# Patient Record
Sex: FEMALE | Race: WHITE | NOT HISPANIC OR LATINO | URBAN - METROPOLITAN AREA
[De-identification: names, ages, dates, MRNs, and addresses within clinical notes are randomized per-mention and may not be internally consistent; named-entity substitution may affect disease eponyms.]

---

## 2023-10-02 ENCOUNTER — LAB REQUISITION (OUTPATIENT)
Dept: LAB | Facility: HOSPITAL | Age: 88
End: 2023-10-02
Payer: MEDICARE

## 2023-10-02 DIAGNOSIS — J44.9 CHRONIC OBSTRUCTIVE PULMONARY DISEASE, UNSPECIFIED (MULTI): ICD-10-CM

## 2023-10-02 LAB
ANION GAP SERPL CALC-SCNC: 11 MMOL/L
BUN SERPL-MCNC: 18 MG/DL (ref 8–25)
CALCIUM SERPL-MCNC: 9.6 MG/DL (ref 8.5–10.4)
CHLORIDE SERPL-SCNC: 98 MMOL/L (ref 97–107)
CO2 SERPL-SCNC: 31 MMOL/L (ref 24–31)
CREAT SERPL-MCNC: 0.8 MG/DL (ref 0.4–1.6)
ERYTHROCYTE [DISTWIDTH] IN BLOOD BY AUTOMATED COUNT: 15.3 % (ref 11.5–14.5)
GFR SERPL CREATININE-BSD FRML MDRD: 68 ML/MIN/1.73M*2
GLUCOSE SERPL-MCNC: 124 MG/DL (ref 65–99)
HCT VFR BLD AUTO: 39.1 % (ref 36–46)
HGB BLD-MCNC: 12.2 G/DL (ref 12–16)
MCH RBC QN AUTO: 27.6 PG (ref 26–34)
MCHC RBC AUTO-ENTMCNC: 31.2 G/DL (ref 32–36)
MCV RBC AUTO: 89 FL (ref 80–100)
NRBC BLD-RTO: 0 /100 WBCS (ref 0–0)
PLATELET # BLD AUTO: 357 X10*3/UL (ref 150–450)
PMV BLD AUTO: 10.8 FL (ref 7.5–11.5)
POTASSIUM SERPL-SCNC: 3.9 MMOL/L (ref 3.4–5.1)
RBC # BLD AUTO: 4.42 X10*6/UL (ref 4–5.2)
SODIUM SERPL-SCNC: 140 MMOL/L (ref 133–145)
WBC # BLD AUTO: 11.2 X10*3/UL (ref 4.4–11.3)

## 2023-10-02 PROCEDURE — 85027 COMPLETE CBC AUTOMATED: CPT | Mod: OUT | Performed by: INTERNAL MEDICINE

## 2023-10-02 PROCEDURE — 82310 ASSAY OF CALCIUM: CPT | Mod: OUT | Performed by: INTERNAL MEDICINE

## 2023-10-02 PROCEDURE — 82374 ASSAY BLOOD CARBON DIOXIDE: CPT | Mod: OUT | Performed by: INTERNAL MEDICINE

## 2023-10-02 PROCEDURE — 80048 BASIC METABOLIC PNL TOTAL CA: CPT | Mod: OUT | Performed by: INTERNAL MEDICINE

## 2023-10-03 ENCOUNTER — LAB REQUISITION (OUTPATIENT)
Dept: LAB | Facility: HOSPITAL | Age: 88
End: 2023-10-03

## 2023-10-03 DIAGNOSIS — J44.9 CHRONIC OBSTRUCTIVE PULMONARY DISEASE, UNSPECIFIED (MULTI): ICD-10-CM

## 2023-10-23 ENCOUNTER — NURSING HOME VISIT (OUTPATIENT)
Dept: POST ACUTE CARE | Facility: EXTERNAL LOCATION | Age: 88
End: 2023-10-23
Payer: MEDICARE

## 2023-10-23 DIAGNOSIS — I50.22 CHRONIC SYSTOLIC CONGESTIVE HEART FAILURE (MULTI): ICD-10-CM

## 2023-10-23 DIAGNOSIS — R47.01 APHASIA: ICD-10-CM

## 2023-10-23 DIAGNOSIS — H10.33 ACUTE BACTERIAL CONJUNCTIVITIS OF BOTH EYES: Primary | ICD-10-CM

## 2023-10-23 DIAGNOSIS — I63.9 CEREBROVASCULAR ACCIDENT (CVA), UNSPECIFIED MECHANISM (MULTI): ICD-10-CM

## 2023-10-23 DIAGNOSIS — J06.9 ACUTE UPPER RESPIRATORY INFECTION: ICD-10-CM

## 2023-10-23 PROCEDURE — 99309 SBSQ NF CARE MODERATE MDM 30: CPT | Performed by: PHYSICIAN ASSISTANT

## 2023-10-23 ASSESSMENT — ENCOUNTER SYMPTOMS
ABDOMINAL PAIN: 0
DIZZINESS: 0
NERVOUS/ANXIOUS: 0
WHEEZING: 0
NAUSEA: 0
RHINORRHEA: 1
VOMITING: 0
APPETITE CHANGE: 0
DYSURIA: 0
CHILLS: 0
FREQUENCY: 0
EYE DISCHARGE: 1
HEADACHES: 0
TREMORS: 0
WEAKNESS: 0
COUGH: 1
FEVER: 0
CONFUSION: 0
HEMATURIA: 0
SHORTNESS OF BREATH: 0
EYE REDNESS: 1
DIARRHEA: 0
CONSTIPATION: 0

## 2023-10-23 NOTE — ASSESSMENT & PLAN NOTE
Possibly sinusitis.  Pt has had pneumonia multiple times.   Will start augmentin for possible sinusitis.  Chest-ray is negative.  Pt at high risk for progression to pneumonia.  Monitor closely. Covid swab was negative.  Will r/o Flu.   CBC and bmp in am.

## 2023-10-23 NOTE — PROGRESS NOTES
No chief complaint on file.      Subjective   [id number] : Leola Ward is a 89 y.o. female LTC resident at [facility].    HPI  Notified by nursing that pt is having cold symptoms.  She has redness and drainage from both eyes.  She is noted to be sneezing with signficant nasal congestion.  No fevers or chills reported.  She is eating and drinking well.  No shortness of breath or cough is noted.  She had chest x-ray which did not show any pneumonia or chf.   Covid Swab is negative.  Facility does not have flu swab but will be able to do one tomorrow.    Review of Systems   Constitutional:  Negative for appetite change, chills and fever.   HENT:  Positive for congestion, rhinorrhea and sneezing.    Eyes:  Positive for discharge and redness.   Respiratory:  Positive for cough. Negative for shortness of breath and wheezing.    Cardiovascular:  Negative for chest pain and leg swelling.   Gastrointestinal:  Negative for abdominal pain, constipation, diarrhea, nausea and vomiting.   Genitourinary:  Negative for dysuria, frequency and hematuria.   Neurological:  Negative for dizziness, tremors, weakness and headaches.   Psychiatric/Behavioral:  Negative for confusion. The patient is not nervous/anxious.    All other systems reviewed and are negative.      Objective   There were no vitals taken for this visit.   Physical Exam  Constitutional:       General: She is not in acute distress.     Appearance: She is ill-appearing.   Eyes:      General:         Right eye: Discharge present.         Left eye: Discharge present.     Pupils: Pupils are equal, round, and reactive to light.   Cardiovascular:      Rate and Rhythm: Normal rate and regular rhythm.      Heart sounds: No murmur heard.  Pulmonary:      Effort: Pulmonary effort is normal.      Breath sounds: No wheezing, rhonchi or rales.   Abdominal:      General: Abdomen is flat. Bowel sounds are normal. There is no distension.      Palpations: Abdomen is soft. There is no  "mass.      Tenderness: There is no abdominal tenderness.   Musculoskeletal:         General: No swelling. Normal range of motion.   Skin:     General: Skin is warm and dry.      Findings: No rash.   Neurological:      Mental Status: She is alert and oriented to person, place, and time. Mental status is at baseline.      Comments: Right sided hemiparesis and aphasia.        No lab exists for component: \"CBC BMP\"  Assessment/Plan   Problem List Items Addressed This Visit             ICD-10-CM    Acute bacterial conjunctivitis of both eyes - Primary H10.33     Gentamicin 2gtts to both eyes TID x 7 days.            Acute upper respiratory infection J06.9     Possibly sinusitis.  Pt has had pneumonia multiple times.   Will start augmentin for possible sinusitis.  Chest-ray is negative.  Pt at high risk for progression to pneumonia.  Monitor closely. Covid swab was negative.  Will r/o Flu.   CBC and bmp in am.         Chronic systolic congestive heart failure (CMS/HCC) I50.22     Continue torsemide 20mg and spironalactone.  Monitor labs.          CVA (cerebral vascular accident) (CMS/HCC) I63.9    Aphasia R47.01           "

## 2023-10-23 NOTE — LETTER
Patient: Leola Ward  : 1928    Encounter Date: 10/23/2023    No chief complaint on file.      Subjective  [id number] : Leola Ward is a 89 y.o. female LTC resident at [facility].    HPI  Notified by nursing that pt is having cold symptoms.  She has redness and drainage from both eyes.  She is noted to be sneezing with signficant nasal congestion.  No fevers or chills reported.  She is eating and drinking well.  No shortness of breath or cough is noted.  She had chest x-ray which did not show any pneumonia or chf.   Covid Swab is negative.  Facility does not have flu swab but will be able to do one tomorrow.    Review of Systems   Constitutional:  Negative for appetite change, chills and fever.   HENT:  Positive for congestion, rhinorrhea and sneezing.    Eyes:  Positive for discharge and redness.   Respiratory:  Positive for cough. Negative for shortness of breath and wheezing.    Cardiovascular:  Negative for chest pain and leg swelling.   Gastrointestinal:  Negative for abdominal pain, constipation, diarrhea, nausea and vomiting.   Genitourinary:  Negative for dysuria, frequency and hematuria.   Neurological:  Negative for dizziness, tremors, weakness and headaches.   Psychiatric/Behavioral:  Negative for confusion. The patient is not nervous/anxious.    All other systems reviewed and are negative.      Objective  There were no vitals taken for this visit.   Physical Exam  Constitutional:       General: She is not in acute distress.     Appearance: She is ill-appearing.   Eyes:      General:         Right eye: Discharge present.         Left eye: Discharge present.     Pupils: Pupils are equal, round, and reactive to light.   Cardiovascular:      Rate and Rhythm: Normal rate and regular rhythm.      Heart sounds: No murmur heard.  Pulmonary:      Effort: Pulmonary effort is normal.      Breath sounds: No wheezing, rhonchi or rales.   Abdominal:      General: Abdomen is flat. Bowel sounds are normal.  "There is no distension.      Palpations: Abdomen is soft. There is no mass.      Tenderness: There is no abdominal tenderness.   Musculoskeletal:         General: No swelling. Normal range of motion.   Skin:     General: Skin is warm and dry.      Findings: No rash.   Neurological:      Mental Status: She is alert and oriented to person, place, and time. Mental status is at baseline.      Comments: Right sided hemiparesis and aphasia.        No lab exists for component: \"CBC BMP\"  Assessment/Plan  Problem List Items Addressed This Visit             ICD-10-CM    Acute bacterial conjunctivitis of both eyes - Primary H10.33     Gentamicin 2gtts to both eyes TID x 7 days.            Acute upper respiratory infection J06.9     Possibly sinusitis.  Pt has had pneumonia multiple times.   Will start augmentin for possible sinusitis.  Chest-ray is negative.  Pt at high risk for progression to pneumonia.  Monitor closely. Covid swab was negative.  Will r/o Flu.   CBC and bmp in am.         Chronic systolic congestive heart failure (CMS/HCC) I50.22     Continue torsemide 20mg and spironalactone.  Monitor labs.          CVA (cerebral vascular accident) (CMS/HCC) I63.9    Aphasia R47.01               Electronically Signed By: Marie Cuevas PA-C   10/23/23  7:55 PM  "

## 2023-10-24 ENCOUNTER — LAB REQUISITION (OUTPATIENT)
Dept: LAB | Facility: HOSPITAL | Age: 88
End: 2023-10-24
Payer: MEDICARE

## 2023-10-24 DIAGNOSIS — J44.9 CHRONIC OBSTRUCTIVE PULMONARY DISEASE, UNSPECIFIED (MULTI): ICD-10-CM

## 2023-10-24 LAB
ANION GAP SERPL CALC-SCNC: 12 MMOL/L
BUN SERPL-MCNC: 28 MG/DL (ref 8–25)
CALCIUM SERPL-MCNC: 9 MG/DL (ref 8.5–10.4)
CHLORIDE SERPL-SCNC: 94 MMOL/L (ref 97–107)
CO2 SERPL-SCNC: 30 MMOL/L (ref 24–31)
CREAT SERPL-MCNC: 0.9 MG/DL (ref 0.4–1.6)
ERYTHROCYTE [DISTWIDTH] IN BLOOD BY AUTOMATED COUNT: 15.2 % (ref 11.5–14.5)
GFR SERPL CREATININE-BSD FRML MDRD: 59 ML/MIN/1.73M*2
GLUCOSE SERPL-MCNC: 103 MG/DL (ref 65–99)
HCT VFR BLD AUTO: 39.9 % (ref 36–46)
HGB BLD-MCNC: 12.4 G/DL (ref 12–16)
MCH RBC QN AUTO: 27.4 PG (ref 26–34)
MCHC RBC AUTO-ENTMCNC: 31.1 G/DL (ref 32–36)
MCV RBC AUTO: 88 FL (ref 80–100)
NRBC BLD-RTO: 0 /100 WBCS (ref 0–0)
PLATELET # BLD AUTO: 346 X10*3/UL (ref 150–450)
PMV BLD AUTO: 10.7 FL (ref 7.5–11.5)
POTASSIUM SERPL-SCNC: 3.8 MMOL/L (ref 3.4–5.1)
RBC # BLD AUTO: 4.53 X10*6/UL (ref 4–5.2)
SODIUM SERPL-SCNC: 136 MMOL/L (ref 133–145)
WBC # BLD AUTO: 12 X10*3/UL (ref 4.4–11.3)

## 2023-10-24 PROCEDURE — 80048 BASIC METABOLIC PNL TOTAL CA: CPT | Mod: OUT | Performed by: INTERNAL MEDICINE

## 2023-10-24 PROCEDURE — 85027 COMPLETE CBC AUTOMATED: CPT | Mod: OUT | Performed by: INTERNAL MEDICINE

## 2023-10-24 PROCEDURE — 87502 INFLUENZA DNA AMP PROBE: CPT | Mod: OUT,TRILAB | Performed by: INTERNAL MEDICINE

## 2023-10-25 LAB
FLUAV RNA RESP QL NAA+PROBE: NOT DETECTED
FLUBV RNA RESP QL NAA+PROBE: NOT DETECTED

## 2023-11-02 ENCOUNTER — NURSING HOME VISIT (OUTPATIENT)
Dept: POST ACUTE CARE | Facility: EXTERNAL LOCATION | Age: 88
End: 2023-11-02
Payer: MEDICARE

## 2023-11-02 VITALS
HEART RATE: 78 BPM | DIASTOLIC BLOOD PRESSURE: 67 MMHG | RESPIRATION RATE: 18 BRPM | OXYGEN SATURATION: 93 % | WEIGHT: 194.6 LBS | SYSTOLIC BLOOD PRESSURE: 115 MMHG | TEMPERATURE: 98.1 F

## 2023-11-02 DIAGNOSIS — D72.829 LEUKOCYTOSIS, UNSPECIFIED TYPE: Primary | ICD-10-CM

## 2023-11-02 DIAGNOSIS — R47.01 APHASIA: ICD-10-CM

## 2023-11-02 DIAGNOSIS — I63.9 CEREBROVASCULAR ACCIDENT (CVA), UNSPECIFIED MECHANISM (MULTI): ICD-10-CM

## 2023-11-02 DIAGNOSIS — I50.22 CHRONIC SYSTOLIC CONGESTIVE HEART FAILURE (MULTI): ICD-10-CM

## 2023-11-02 PROCEDURE — 99309 SBSQ NF CARE MODERATE MDM 30: CPT | Performed by: PHYSICIAN ASSISTANT

## 2023-11-02 ASSESSMENT — ENCOUNTER SYMPTOMS
NERVOUS/ANXIOUS: 0
TREMORS: 0
WEAKNESS: 0
HEADACHES: 0
NAUSEA: 0
WHEEZING: 0
CONSTIPATION: 0
CHILLS: 0
DIZZINESS: 0
EYE REDNESS: 0
CONFUSION: 0
FREQUENCY: 0
SHORTNESS OF BREATH: 0
DIARRHEA: 0
DYSURIA: 0
HEMATURIA: 0
RHINORRHEA: 0
FEVER: 0
ABDOMINAL PAIN: 0
COUGH: 0
APPETITE CHANGE: 0
EYE DISCHARGE: 0
VOMITING: 0

## 2023-11-02 NOTE — LETTER
Patient: Leola Ward  : 1928    Encounter Date: 2023    No chief complaint on file.      Subjective  [id number] : Leola Ward is a 89 y.o. female LTC resident at [facility].    HPI   Pt seen while sitting up in wheelchair.  Pt had routine labs today.  Noted to have an elevated WBC of 16 on todays labs.   No fevers or chills noted.   She does not appear to be septic.  She does have some mild lower abdominal discomfort on exam.   She has h/o UTI's.  Pt recently treated for bilateral conjunctivitis which has resolved.   She was also treated for URI and these symptoms have also resolved.    She is eating and drinking well.  No shortness of breath or cough is noted.  She had chest x-ray on 10/31 which did not show any pneumonia or chf.    She is alert, pleasant and interactive.    Review of Systems   Constitutional:  Negative for appetite change, chills and fever.   HENT:  Negative for congestion, rhinorrhea and sneezing.    Eyes:  Negative for discharge and redness.   Respiratory:  Negative for cough, shortness of breath and wheezing.    Cardiovascular:  Negative for chest pain and leg swelling.   Gastrointestinal:  Negative for abdominal pain, constipation, diarrhea, nausea and vomiting.   Genitourinary:  Negative for dysuria, frequency and hematuria.   Neurological:  Negative for dizziness, tremors, weakness and headaches.   Psychiatric/Behavioral:  Negative for confusion. The patient is not nervous/anxious.    All other systems reviewed and are negative.      Objective  /67   Pulse 78   Temp 36.7 °C (98.1 °F)   Resp 18   Wt 88.3 kg (194 lb 9.6 oz)   SpO2 93%    Physical Exam  Constitutional:       General: She is not in acute distress.     Appearance: She is not ill-appearing.   Eyes:      Pupils: Pupils are equal, round, and reactive to light.   Cardiovascular:      Rate and Rhythm: Normal rate and regular rhythm.      Heart sounds: No murmur heard.  Pulmonary:      Effort: Pulmonary  "effort is normal.      Breath sounds: No wheezing, rhonchi or rales.   Abdominal:      General: Abdomen is flat. Bowel sounds are normal. There is no distension.      Palpations: Abdomen is soft. There is no mass.      Tenderness: There is no abdominal tenderness.   Musculoskeletal:         General: No swelling. Normal range of motion.   Skin:     General: Skin is warm and dry.      Findings: No rash.   Neurological:      Mental Status: She is alert and oriented to person, place, and time. Mental status is at baseline.      Comments: Right sided hemiparesis and aphasia.        No lab exists for component: \"CBC BMP\"  Assessment/Plan                        Chronic systolic congestive heart failure (CMS/HCC) I50.22       Continue torsemide 20mg and spironalactone.  Monitor labs.            CVA (cerebral vascular accident) (CMS/HCC) I63.9     Aphasia R47.01       leukocytosis   PT had chest x-ray on 10/31 which was negative for pneumonia.  WBC is 16 on today's labs.   Will send UA C&S to r/o UTI.  Repeat CBC on monday          Electronically Signed By: Marie Cuevas PA-C   11/2/23  2:04 PM  "

## 2023-11-02 NOTE — PROGRESS NOTES
No chief complaint on file.      Subjective   [id number] : Leola Ward is a 89 y.o. female LTC resident at [facility].    HPI   Pt seen while sitting up in wheelchair.  Pt had routine labs today.  Noted to have an elevated WBC of 16 on todays labs.   No fevers or chills noted.   She does not appear to be septic.  She does have some mild lower abdominal discomfort on exam.   She has h/o UTI's.  Pt recently treated for bilateral conjunctivitis which has resolved.   She was also treated for URI and these symptoms have also resolved.    She is eating and drinking well.  No shortness of breath or cough is noted.  She had chest x-ray on 10/31 which did not show any pneumonia or chf.    She is alert, pleasant and interactive.    Review of Systems   Constitutional:  Negative for appetite change, chills and fever.   HENT:  Negative for congestion, rhinorrhea and sneezing.    Eyes:  Negative for discharge and redness.   Respiratory:  Negative for cough, shortness of breath and wheezing.    Cardiovascular:  Negative for chest pain and leg swelling.   Gastrointestinal:  Negative for abdominal pain, constipation, diarrhea, nausea and vomiting.   Genitourinary:  Negative for dysuria, frequency and hematuria.   Neurological:  Negative for dizziness, tremors, weakness and headaches.   Psychiatric/Behavioral:  Negative for confusion. The patient is not nervous/anxious.    All other systems reviewed and are negative.      Objective   /67   Pulse 78   Temp 36.7 °C (98.1 °F)   Resp 18   Wt 88.3 kg (194 lb 9.6 oz)   SpO2 93%    Physical Exam  Constitutional:       General: She is not in acute distress.     Appearance: She is not ill-appearing.   Eyes:      Pupils: Pupils are equal, round, and reactive to light.   Cardiovascular:      Rate and Rhythm: Normal rate and regular rhythm.      Heart sounds: No murmur heard.  Pulmonary:      Effort: Pulmonary effort is normal.      Breath sounds: No wheezing, rhonchi or rales.  "  Abdominal:      General: Abdomen is flat. Bowel sounds are normal. There is no distension.      Palpations: Abdomen is soft. There is no mass.      Tenderness: There is no abdominal tenderness.   Musculoskeletal:         General: No swelling. Normal range of motion.   Skin:     General: Skin is warm and dry.      Findings: No rash.   Neurological:      Mental Status: She is alert and oriented to person, place, and time. Mental status is at baseline.      Comments: Right sided hemiparesis and aphasia.        No lab exists for component: \"CBC BMP\"  Assessment/Plan                         Chronic systolic congestive heart failure (CMS/HCC) I50.22       Continue torsemide 20mg and spironalactone.  Monitor labs.            CVA (cerebral vascular accident) (CMS/HCC) I63.9     Aphasia R47.01       leukocytosis   PT had chest x-ray on 10/31 which was negative for pneumonia.  WBC is 16 on today's labs.   Will send UA C&S to r/o UTI.  Repeat CBC on monday      "

## 2023-11-03 ENCOUNTER — LAB REQUISITION (OUTPATIENT)
Dept: LAB | Facility: HOSPITAL | Age: 88
End: 2023-11-03
Payer: MEDICARE

## 2023-11-03 DIAGNOSIS — Z90.710 ACQUIRED ABSENCE OF BOTH CERVIX AND UTERUS: ICD-10-CM

## 2023-11-03 DIAGNOSIS — R06.00 DYSPNEA, UNSPECIFIED: ICD-10-CM

## 2023-11-03 DIAGNOSIS — R13.10 DYSPHAGIA, UNSPECIFIED: ICD-10-CM

## 2023-11-03 LAB
APPEARANCE UR: CLEAR
BILIRUB UR STRIP.AUTO-MCNC: NEGATIVE MG/DL
COLOR UR: NORMAL
GLUCOSE UR STRIP.AUTO-MCNC: NORMAL MG/DL
KETONES UR STRIP.AUTO-MCNC: NEGATIVE MG/DL
LEUKOCYTE ESTERASE UR QL STRIP.AUTO: NEGATIVE
NITRITE UR QL STRIP.AUTO: NEGATIVE
PH UR STRIP.AUTO: 6 [PH]
PROT UR STRIP.AUTO-MCNC: NEGATIVE MG/DL
RBC # UR STRIP.AUTO: NEGATIVE /UL
SP GR UR STRIP.AUTO: 1.01
UROBILINOGEN UR STRIP.AUTO-MCNC: NORMAL MG/DL

## 2023-11-03 PROCEDURE — 87086 URINE CULTURE/COLONY COUNT: CPT

## 2023-11-03 PROCEDURE — 81003 URINALYSIS AUTO W/O SCOPE: CPT | Mod: OUT | Performed by: INTERNAL MEDICINE

## 2023-11-03 PROCEDURE — 87086 URINE CULTURE/COLONY COUNT: CPT | Mod: OUT,TRILAB,WESLAB | Performed by: INTERNAL MEDICINE

## 2023-11-03 PROCEDURE — 81003 URINALYSIS AUTO W/O SCOPE: CPT

## 2023-11-05 LAB — BACTERIA UR CULT: NORMAL

## 2023-11-06 ENCOUNTER — NURSING HOME VISIT (OUTPATIENT)
Dept: POST ACUTE CARE | Facility: EXTERNAL LOCATION | Age: 88
End: 2023-11-06
Payer: MEDICARE

## 2023-11-06 VITALS
SYSTOLIC BLOOD PRESSURE: 110 MMHG | OXYGEN SATURATION: 92 % | TEMPERATURE: 98.5 F | RESPIRATION RATE: 20 BRPM | WEIGHT: 194.6 LBS | HEART RATE: 83 BPM | DIASTOLIC BLOOD PRESSURE: 70 MMHG

## 2023-11-06 DIAGNOSIS — I63.9 CEREBROVASCULAR ACCIDENT (CVA), UNSPECIFIED MECHANISM (MULTI): ICD-10-CM

## 2023-11-06 DIAGNOSIS — I50.22 CHRONIC SYSTOLIC CONGESTIVE HEART FAILURE (MULTI): ICD-10-CM

## 2023-11-06 DIAGNOSIS — J18.9 PNEUMONIA OF RIGHT UPPER LOBE DUE TO INFECTIOUS ORGANISM: Primary | ICD-10-CM

## 2023-11-06 DIAGNOSIS — R47.01 APHASIA: ICD-10-CM

## 2023-11-06 PROCEDURE — 99309 SBSQ NF CARE MODERATE MDM 30: CPT | Performed by: PHYSICIAN ASSISTANT

## 2023-11-06 ASSESSMENT — ENCOUNTER SYMPTOMS
SHORTNESS OF BREATH: 0
HEADACHES: 0
ABDOMINAL PAIN: 0
TREMORS: 0
VOMITING: 0
CONFUSION: 0
CONSTIPATION: 0
FREQUENCY: 0
DIZZINESS: 0
RHINORRHEA: 0
NERVOUS/ANXIOUS: 0
WHEEZING: 0
APPETITE CHANGE: 0
NAUSEA: 0
EYE DISCHARGE: 0
HEMATURIA: 0
CHILLS: 0
FEVER: 0
COUGH: 0
EYE REDNESS: 0
WEAKNESS: 0
DIARRHEA: 0
DYSURIA: 0

## 2023-11-06 NOTE — LETTER
Patient: Leola Ward  : 1928    Encounter Date: 2023    No chief complaint on file.      Subjective  [id number] : Leola Ward is a 89 y.o. female LTC resident at [facility].    HPI   Was asked to see by nursing.   Nursing reports that pt is short of breath with a pulse ox of 87 on room air.  Pt has PRN oxygen and is 92 % on 2liters of oxygen.  She has some noted rhonchi to the right side of chest.  She completed course of augmentin over the weekend.  She did have urinalysis sent over the weekend which was negative.   Pt had elevated WBC last week.   No fevers or chills reported.  Pt is alert, pleasant and interactive.  She does appear to be short of breath.  Pt seen while sitting up in wheelchair.  Pt had routine labs today.  Noted to have an elevated WBC of 16 on todays labs.   No fevers or chills noted.   She does not appear to be septic.  She does have some mild lower abdominal discomfort on exam.   She has h/o UTI's.  Pt recently treated for bilateral conjunctivitis which has resolved.   She was also treated for URI and these symptoms have also resolved.    She is eating and drinking well.  No shortness of breath or cough is noted.  She had chest x-ray on 10/31 which did not show any pneumonia or chf.    She is alert, pleasant and interactive.    Review of Systems   Constitutional:  Negative for appetite change, chills and fever.   HENT:  Negative for congestion, rhinorrhea and sneezing.    Eyes:  Negative for discharge and redness.   Respiratory:  Negative for cough, shortness of breath and wheezing.    Cardiovascular:  Negative for chest pain and leg swelling.   Gastrointestinal:  Negative for abdominal pain, constipation, diarrhea, nausea and vomiting.   Genitourinary:  Negative for dysuria, frequency and hematuria.   Neurological:  Negative for dizziness, tremors, weakness and headaches.   Psychiatric/Behavioral:  Negative for confusion. The patient is not nervous/anxious.    All other  "systems reviewed and are negative.      Objective  /70   Pulse 83   Temp 36.9 °C (98.5 °F)   Resp 20   Wt 88.3 kg (194 lb 9.6 oz)   SpO2 92%    Physical Exam  Constitutional:       General: She is not in acute distress.     Appearance: She is not ill-appearing.   Eyes:      Pupils: Pupils are equal, round, and reactive to light.   Cardiovascular:      Rate and Rhythm: Normal rate and regular rhythm.      Heart sounds: No murmur heard.  Pulmonary:      Effort: Pulmonary effort is normal.      Breath sounds: Rhonchi (right upper/ middle lobes) present. No wheezing or rales.   Abdominal:      General: Abdomen is flat. Bowel sounds are normal. There is no distension.      Palpations: Abdomen is soft. There is no mass.      Tenderness: There is no abdominal tenderness.   Musculoskeletal:         General: No swelling. Normal range of motion.   Skin:     General: Skin is warm and dry.      Findings: No rash.   Neurological:      Mental Status: She is alert and oriented to person, place, and time. Mental status is at baseline.      Comments: Right sided hemiparesis and aphasia.        No lab exists for component: \"CBC BMP\"  Assessment/Plan        Pneumonia:  completed course of augmentin.   Symptoms worsening.  Start levaquin.   On oxygen and cough medication.                 Chronic systolic congestive heart failure (CMS/HCC) I50.22       Continue torsemide 20mg and spironalactone.  Monitor labs.            CVA (cerebral vascular accident) (CMS/HCC) I63.9     Aphasia R47.01       leukocytosis   PT had chest x-ray on 10/31 which was negative for pneumonia.  WBC is 16 on today's labs.   Will send UA C&S to r/o UTI.  Repeat CBC on monday          Electronically Signed By: Marie Cuevas PA-C   11/6/23  5:26 PM  "

## 2023-11-06 NOTE — ASSESSMENT & PLAN NOTE
Completed course of augmentin over the weekend.   Symptoms worsening.   Start levaquin 500mg daily. Currently on oxygen.

## 2023-11-06 NOTE — PROGRESS NOTES
No chief complaint on file.      Subjective   [id number] : Leola Ward is a 89 y.o. female LTC resident at [facility].    HPI   Was asked to see by nursing.   Nursing reports that pt is short of breath with a pulse ox of 87 on room air.  Pt has PRN oxygen and is 92 % on 2liters of oxygen.  She has some noted rhonchi to the right side of chest.  She completed course of augmentin over the weekend.  She did have urinalysis sent over the weekend which was negative.   Pt had elevated WBC last week.   No fevers or chills reported.  Pt is alert, pleasant and interactive.  She does appear to be short of breath.  Pt seen while sitting up in wheelchair.  Pt had routine labs today.  Noted to have an elevated WBC of 16 on todays labs.   No fevers or chills noted.   She does not appear to be septic.  She does have some mild lower abdominal discomfort on exam.   She has h/o UTI's.  Pt recently treated for bilateral conjunctivitis which has resolved.   She was also treated for URI and these symptoms have also resolved.    She is eating and drinking well.  No shortness of breath or cough is noted.  She had chest x-ray on 10/31 which did not show any pneumonia or chf.    She is alert, pleasant and interactive.    Review of Systems   Constitutional:  Negative for appetite change, chills and fever.   HENT:  Negative for congestion, rhinorrhea and sneezing.    Eyes:  Negative for discharge and redness.   Respiratory:  Negative for cough, shortness of breath and wheezing.    Cardiovascular:  Negative for chest pain and leg swelling.   Gastrointestinal:  Negative for abdominal pain, constipation, diarrhea, nausea and vomiting.   Genitourinary:  Negative for dysuria, frequency and hematuria.   Neurological:  Negative for dizziness, tremors, weakness and headaches.   Psychiatric/Behavioral:  Negative for confusion. The patient is not nervous/anxious.    All other systems reviewed and are negative.      Objective   /70   Pulse 83  "  Temp 36.9 °C (98.5 °F)   Resp 20   Wt 88.3 kg (194 lb 9.6 oz)   SpO2 92%    Physical Exam  Constitutional:       General: She is not in acute distress.     Appearance: She is not ill-appearing.   Eyes:      Pupils: Pupils are equal, round, and reactive to light.   Cardiovascular:      Rate and Rhythm: Normal rate and regular rhythm.      Heart sounds: No murmur heard.  Pulmonary:      Effort: Pulmonary effort is normal.      Breath sounds: Rhonchi (right upper/ middle lobes) present. No wheezing or rales.   Abdominal:      General: Abdomen is flat. Bowel sounds are normal. There is no distension.      Palpations: Abdomen is soft. There is no mass.      Tenderness: There is no abdominal tenderness.   Musculoskeletal:         General: No swelling. Normal range of motion.   Skin:     General: Skin is warm and dry.      Findings: No rash.   Neurological:      Mental Status: She is alert and oriented to person, place, and time. Mental status is at baseline.      Comments: Right sided hemiparesis and aphasia.        No lab exists for component: \"CBC BMP\"  Assessment/Plan         Pneumonia:  completed course of augmentin.   Symptoms worsening.  Start levaquin.   On oxygen and cough medication.                 Chronic systolic congestive heart failure (CMS/HCC) I50.22       Continue torsemide 20mg and spironalactone.  Monitor labs.            CVA (cerebral vascular accident) (CMS/HCC) I63.9     Aphasia R47.01       leukocytosis   PT had chest x-ray on 10/31 which was negative for pneumonia.  WBC is 16 on today's labs.   Will send UA C&S to r/o UTI.  Repeat CBC on monday      "

## 2023-11-07 ENCOUNTER — LAB REQUISITION (OUTPATIENT)
Dept: LAB | Facility: HOSPITAL | Age: 88
End: 2023-11-07
Payer: MEDICARE

## 2023-11-07 DIAGNOSIS — J44.9 CHRONIC OBSTRUCTIVE PULMONARY DISEASE, UNSPECIFIED (MULTI): ICD-10-CM

## 2023-11-07 LAB
ANION GAP SERPL CALC-SCNC: 6 MMOL/L
BUN SERPL-MCNC: 24 MG/DL (ref 8–25)
CALCIUM SERPL-MCNC: 9.7 MG/DL (ref 8.5–10.4)
CHLORIDE SERPL-SCNC: 96 MMOL/L (ref 97–107)
CO2 SERPL-SCNC: 36 MMOL/L (ref 24–31)
CREAT SERPL-MCNC: 0.8 MG/DL (ref 0.4–1.6)
ERYTHROCYTE [DISTWIDTH] IN BLOOD BY AUTOMATED COUNT: 14.8 % (ref 11.5–14.5)
GFR SERPL CREATININE-BSD FRML MDRD: 68 ML/MIN/1.73M*2
GLUCOSE SERPL-MCNC: 96 MG/DL (ref 65–99)
HCT VFR BLD AUTO: 39.9 % (ref 36–46)
HGB BLD-MCNC: 12.6 G/DL (ref 12–16)
MCH RBC QN AUTO: 27.3 PG (ref 26–34)
MCHC RBC AUTO-ENTMCNC: 31.6 G/DL (ref 32–36)
MCV RBC AUTO: 86 FL (ref 80–100)
NRBC BLD-RTO: 0 /100 WBCS (ref 0–0)
PLATELET # BLD AUTO: 448 X10*3/UL (ref 150–450)
POTASSIUM SERPL-SCNC: 4.8 MMOL/L (ref 3.4–5.1)
RBC # BLD AUTO: 4.62 X10*6/UL (ref 4–5.2)
SODIUM SERPL-SCNC: 138 MMOL/L (ref 133–145)
WBC # BLD AUTO: 14.9 X10*3/UL (ref 4.4–11.3)

## 2023-11-07 PROCEDURE — 82374 ASSAY BLOOD CARBON DIOXIDE: CPT | Mod: OUT | Performed by: INTERNAL MEDICINE

## 2023-11-07 PROCEDURE — 80048 BASIC METABOLIC PNL TOTAL CA: CPT | Mod: OUT | Performed by: INTERNAL MEDICINE

## 2023-11-07 PROCEDURE — 85027 COMPLETE CBC AUTOMATED: CPT | Mod: OUT | Performed by: INTERNAL MEDICINE

## 2023-11-09 ENCOUNTER — NURSING HOME VISIT (OUTPATIENT)
Dept: POST ACUTE CARE | Facility: EXTERNAL LOCATION | Age: 88
End: 2023-11-09
Payer: MEDICARE

## 2023-11-09 VITALS
OXYGEN SATURATION: 93 % | WEIGHT: 194.6 LBS | HEART RATE: 76 BPM | TEMPERATURE: 98.1 F | DIASTOLIC BLOOD PRESSURE: 70 MMHG | RESPIRATION RATE: 18 BRPM | SYSTOLIC BLOOD PRESSURE: 130 MMHG

## 2023-11-09 DIAGNOSIS — J18.9 PNEUMONIA OF RIGHT UPPER LOBE DUE TO INFECTIOUS ORGANISM: Primary | ICD-10-CM

## 2023-11-09 DIAGNOSIS — R19.7 DIARRHEA, UNSPECIFIED TYPE: ICD-10-CM

## 2023-11-09 DIAGNOSIS — I63.9 CEREBROVASCULAR ACCIDENT (CVA), UNSPECIFIED MECHANISM (MULTI): ICD-10-CM

## 2023-11-09 DIAGNOSIS — I50.22 CHRONIC SYSTOLIC CONGESTIVE HEART FAILURE (MULTI): ICD-10-CM

## 2023-11-09 DIAGNOSIS — D72.829 LEUKOCYTOSIS, UNSPECIFIED TYPE: ICD-10-CM

## 2023-11-09 PROCEDURE — 87493 C DIFF AMPLIFIED PROBE: CPT | Mod: OUT | Performed by: INTERNAL MEDICINE

## 2023-11-09 PROCEDURE — 99309 SBSQ NF CARE MODERATE MDM 30: CPT | Performed by: PHYSICIAN ASSISTANT

## 2023-11-09 ASSESSMENT — ENCOUNTER SYMPTOMS
COUGH: 1
HEMATURIA: 0
HEADACHES: 0
CHILLS: 0
FREQUENCY: 0
SHORTNESS OF BREATH: 1
WEAKNESS: 0
CONFUSION: 0
DIZZINESS: 0
NAUSEA: 1
WHEEZING: 1
VOMITING: 0
DYSURIA: 0
CONSTIPATION: 0
EYE DISCHARGE: 0
RHINORRHEA: 0
TREMORS: 0
EYE REDNESS: 0
FEVER: 0
NERVOUS/ANXIOUS: 0
APPETITE CHANGE: 0
DIARRHEA: 1
ABDOMINAL PAIN: 0

## 2023-11-09 NOTE — PROGRESS NOTES
No chief complaint on file.      Subjective   [id number] : Leola Ward is a 89 y.o. female LTC resident at [facility].    HPI   Was asked to see by nursing.    Pt currently on levaquin for pneumonia.   Pot continues to have productive cough and wheezing.  She is stable on oxygen.  She appears to be short of breath but is in good spirits and is laughing during my visit.  NO fevers or chills reported.   Nursing reported pt started having diarrhea this afternoon.  She has had multiple loose stools over the afternoon and nursing reported nausea and emesis x 1.   Pt was on augmentin prior to starting the course of levaquin and is likely at risk for c-diff infection.     Review of Systems   Constitutional:  Negative for appetite change, chills and fever.   HENT:  Negative for congestion, rhinorrhea and sneezing.    Eyes:  Negative for discharge and redness.   Respiratory:  Positive for cough, shortness of breath and wheezing.    Cardiovascular:  Negative for chest pain and leg swelling.   Gastrointestinal:  Positive for diarrhea and nausea. Negative for abdominal pain, constipation and vomiting.   Genitourinary:  Negative for dysuria, frequency and hematuria.   Neurological:  Negative for dizziness, tremors, weakness and headaches.   Psychiatric/Behavioral:  Negative for confusion. The patient is not nervous/anxious.    All other systems reviewed and are negative.      Objective   /70   Pulse 76   Temp 36.7 °C (98.1 °F)   Resp 18   Wt 88.3 kg (194 lb 9.6 oz)   SpO2 93%    Physical Exam  Constitutional:       General: She is not in acute distress.     Appearance: She is not ill-appearing.   Eyes:      Pupils: Pupils are equal, round, and reactive to light.   Cardiovascular:      Rate and Rhythm: Normal rate and regular rhythm.      Heart sounds: No murmur heard.  Pulmonary:      Effort: Pulmonary effort is normal.      Breath sounds: Wheezing and rhonchi (productive moist cough.  bilateral rhonchi.) present.  "No rales.   Abdominal:      General: Abdomen is flat. Bowel sounds are normal. There is no distension.      Palpations: Abdomen is soft. There is no mass.      Tenderness: There is no abdominal tenderness.   Musculoskeletal:         General: No swelling. Normal range of motion.   Skin:     General: Skin is warm and dry.      Findings: No rash.   Neurological:      Mental Status: She is alert and oriented to person, place, and time. Mental status is at baseline.      Comments: Right sided hemiparesis and aphasia.        No lab exists for component: \"CBC BMP\"  Assessment/Plan         Pneumonia: continue course of levaquin.   On oxygen and cough medication.   Give mucinex routine BID and albuterol aerosols TID routine.                  Chronic systolic congestive heart failure (CMS/HCC) I50.22       Continue torsemide 20mg and spironalactone.  Monitor labs.            CVA (cerebral vascular accident) (CMS/HCC) I63.9     Aphasia R47.01       leukocytosis     Being treated for pneumonia -  recheck labs in a couple of days    Diarrhea:   likely associated with antibiotic use.   Send stool to r/o C-diff.  Zofran prn for nausea.   Will give imodium and probiotic if C-diff negative.   Monitor for signs of dehydration.  She is currently eating and drinking well.          "

## 2023-11-09 NOTE — LETTER
Patient: Leola Ward  : 1928    Encounter Date: 2023    No chief complaint on file.      Subjective  [id number] : Leola Ward is a 89 y.o. female LTC resident at [facility].    HPI   Was asked to see by nursing.    Pt currently on levaquin for pneumonia.   Pot continues to have productive cough and wheezing.  She is stable on oxygen.  She appears to be short of breath but is in good spirits and is laughing during my visit.  NO fevers or chills reported.   Nursing reported pt started having diarrhea this afternoon.  She has had multiple loose stools over the afternoon and nursing reported nausea and emesis x 1.   Pt was on augmentin prior to starting the course of levaquin and is likely at risk for c-diff infection.     Review of Systems   Constitutional:  Negative for appetite change, chills and fever.   HENT:  Negative for congestion, rhinorrhea and sneezing.    Eyes:  Negative for discharge and redness.   Respiratory:  Positive for cough, shortness of breath and wheezing.    Cardiovascular:  Negative for chest pain and leg swelling.   Gastrointestinal:  Positive for diarrhea and nausea. Negative for abdominal pain, constipation and vomiting.   Genitourinary:  Negative for dysuria, frequency and hematuria.   Neurological:  Negative for dizziness, tremors, weakness and headaches.   Psychiatric/Behavioral:  Negative for confusion. The patient is not nervous/anxious.    All other systems reviewed and are negative.      Objective  /70   Pulse 76   Temp 36.7 °C (98.1 °F)   Resp 18   Wt 88.3 kg (194 lb 9.6 oz)   SpO2 93%    Physical Exam  Constitutional:       General: She is not in acute distress.     Appearance: She is not ill-appearing.   Eyes:      Pupils: Pupils are equal, round, and reactive to light.   Cardiovascular:      Rate and Rhythm: Normal rate and regular rhythm.      Heart sounds: No murmur heard.  Pulmonary:      Effort: Pulmonary effort is normal.      Breath sounds:  "Wheezing and rhonchi (productive moist cough.  bilateral rhonchi.) present. No rales.   Abdominal:      General: Abdomen is flat. Bowel sounds are normal. There is no distension.      Palpations: Abdomen is soft. There is no mass.      Tenderness: There is no abdominal tenderness.   Musculoskeletal:         General: No swelling. Normal range of motion.   Skin:     General: Skin is warm and dry.      Findings: No rash.   Neurological:      Mental Status: She is alert and oriented to person, place, and time. Mental status is at baseline.      Comments: Right sided hemiparesis and aphasia.        No lab exists for component: \"CBC BMP\"  Assessment/Plan        Pneumonia: continue course of levaquin.   On oxygen and cough medication.   Give mucinex routine BID and albuterol aerosols TID routine.                  Chronic systolic congestive heart failure (CMS/HCC) I50.22       Continue torsemide 20mg and spironalactone.  Monitor labs.            CVA (cerebral vascular accident) (CMS/HCC) I63.9     Aphasia R47.01       leukocytosis     Being treated for pneumonia -  recheck labs in a couple of days    Diarrhea:   likely associated with antibiotic use.   Send stool to r/o C-diff.  Zofran prn for nausea.   Will give imodium and probiotic if C-diff negative.   Monitor for signs of dehydration.  She is currently eating and drinking well.            Electronically Signed By: Marie Cuevas PA-C   11/9/23  8:25 PM  "

## 2023-11-10 ENCOUNTER — LAB REQUISITION (OUTPATIENT)
Dept: LAB | Facility: HOSPITAL | Age: 88
End: 2023-11-10
Payer: MEDICARE

## 2023-11-10 ENCOUNTER — NURSING HOME VISIT (OUTPATIENT)
Dept: POST ACUTE CARE | Facility: EXTERNAL LOCATION | Age: 88
End: 2023-11-10
Payer: MEDICARE

## 2023-11-10 VITALS
OXYGEN SATURATION: 94 % | DIASTOLIC BLOOD PRESSURE: 70 MMHG | HEART RATE: 76 BPM | RESPIRATION RATE: 20 BRPM | TEMPERATURE: 97.9 F | SYSTOLIC BLOOD PRESSURE: 130 MMHG | WEIGHT: 194.6 LBS

## 2023-11-10 DIAGNOSIS — I50.22 CHRONIC SYSTOLIC CONGESTIVE HEART FAILURE (MULTI): ICD-10-CM

## 2023-11-10 DIAGNOSIS — D72.829 LEUKOCYTOSIS, UNSPECIFIED TYPE: ICD-10-CM

## 2023-11-10 DIAGNOSIS — R19.7 DIARRHEA, UNSPECIFIED TYPE: ICD-10-CM

## 2023-11-10 DIAGNOSIS — J44.9 CHRONIC OBSTRUCTIVE PULMONARY DISEASE, UNSPECIFIED (MULTI): ICD-10-CM

## 2023-11-10 DIAGNOSIS — J18.9 PNEUMONIA OF RIGHT UPPER LOBE DUE TO INFECTIOUS ORGANISM: Primary | ICD-10-CM

## 2023-11-10 DIAGNOSIS — I63.9 CEREBROVASCULAR ACCIDENT (CVA), UNSPECIFIED MECHANISM (MULTI): ICD-10-CM

## 2023-11-10 LAB — C DIF TOX TCDA+TCDB STL QL NAA+PROBE: NOT DETECTED

## 2023-11-10 PROCEDURE — 99308 SBSQ NF CARE LOW MDM 20: CPT | Performed by: PHYSICIAN ASSISTANT

## 2023-11-10 ASSESSMENT — ENCOUNTER SYMPTOMS
CONFUSION: 0
RHINORRHEA: 0
CONSTIPATION: 0
ABDOMINAL PAIN: 0
TREMORS: 0
NAUSEA: 1
EYE REDNESS: 0
FREQUENCY: 0
DYSURIA: 0
DIARRHEA: 1
NERVOUS/ANXIOUS: 0
HEADACHES: 0
WHEEZING: 1
DIZZINESS: 0
SHORTNESS OF BREATH: 1
HEMATURIA: 0
EYE DISCHARGE: 0
VOMITING: 0
COUGH: 1
APPETITE CHANGE: 0
FEVER: 0
CHILLS: 0
WEAKNESS: 0

## 2023-11-10 NOTE — LETTER
Patient: Leola Ward  : 1928    Encounter Date: 11/10/2023    No chief complaint on file.      Subjective  [id number] : Leola Ward is a 89 y.o. female LTC resident at [facility].    HPI   Was asked to see by nursing again today.   Continued wheezing and productive cough.  Pulse ox is stable at 94 on 2liters.   She does have significant wheezing which improves with aerosols.   She is alert, and interactive.  She does not appear to feel well.  Seen with daughter at the bedside.   Stool sample was sent last night and is negative for C-diff.   Nursing reports no further emesis or diarrhea today.   Likely side effect of antibiotics.  She denies any abdominal pain.    NO fevers or chills reported.    Code status if DNRcc.   Review of Systems   Constitutional:  Negative for appetite change, chills and fever.   HENT:  Negative for congestion, rhinorrhea and sneezing.    Eyes:  Negative for discharge and redness.   Respiratory:  Positive for cough, shortness of breath and wheezing.    Cardiovascular:  Negative for chest pain and leg swelling.   Gastrointestinal:  Positive for diarrhea and nausea. Negative for abdominal pain, constipation and vomiting.   Genitourinary:  Negative for dysuria, frequency and hematuria.   Neurological:  Negative for dizziness, tremors, weakness and headaches.   Psychiatric/Behavioral:  Negative for confusion. The patient is not nervous/anxious.    All other systems reviewed and are negative.      Objective  /70   Pulse 76   Temp 36.6 °C (97.9 °F)   Resp 20   Wt 88.3 kg (194 lb 9.6 oz)   SpO2 94%    Physical Exam  Constitutional:       General: She is not in acute distress.     Appearance: She is not ill-appearing.   Eyes:      Pupils: Pupils are equal, round, and reactive to light.   Cardiovascular:      Rate and Rhythm: Normal rate and regular rhythm.      Heart sounds: No murmur heard.  Pulmonary:      Effort: Pulmonary effort is normal.      Breath sounds: Wheezing and  "rhonchi (productive moist cough.  bilateral rhonchi.) present. No rales.   Abdominal:      General: Abdomen is flat. Bowel sounds are normal. There is no distension.      Palpations: Abdomen is soft. There is no mass.      Tenderness: There is no abdominal tenderness.   Musculoskeletal:         General: No swelling. Normal range of motion.   Skin:     General: Skin is warm and dry.      Findings: No rash.   Neurological:      Mental Status: She is alert and oriented to person, place, and time. Mental status is at baseline.      Comments: Right sided hemiparesis and aphasia.        No lab exists for component: \"CBC BMP\"  Assessment/Plan        Pneumonia: continue course of levaquin and mucinex.  Change aerosols to Duoneb q4 hours while awake  5 days.   Add prednisone burst treatment with prednisone 40mg daily x 5 days.  Will check bmp and cbc in AM.                  Chronic systolic congestive heart failure (CMS/HCC) I50.22       Continue torsemide 20mg and spironalactone.  Monitor labs.            CVA (cerebral vascular accident) (CMS/HCC) I63.9     Aphasia R47.01       leukocytosis     Being treated for pneumonia -  recheck labs in a couple of days    Diarrhea:   likely associated with antibiotic use.   Stool negative for C-diff.   Imodium prn for diarrhea and Zofran prn for nausea.   Seems better today.         Electronically Signed By: Marie Cuevas PA-C   11/10/23  7:45 PM  "

## 2023-11-10 NOTE — PROGRESS NOTES
No chief complaint on file.      Subjective   [id number] : Leola Ward is a 89 y.o. female LTC resident at [facility].    HPI   Was asked to see by nursing again today.   Continued wheezing and productive cough.  Pulse ox is stable at 94 on 2liters.   She does have significant wheezing which improves with aerosols.   She is alert, and interactive.  She does not appear to feel well.  Seen with daughter at the bedside.   Stool sample was sent last night and is negative for C-diff.   Nursing reports no further emesis or diarrhea today.   Likely side effect of antibiotics.  She denies any abdominal pain.    NO fevers or chills reported.    Code status if DNRcc.   Review of Systems   Constitutional:  Negative for appetite change, chills and fever.   HENT:  Negative for congestion, rhinorrhea and sneezing.    Eyes:  Negative for discharge and redness.   Respiratory:  Positive for cough, shortness of breath and wheezing.    Cardiovascular:  Negative for chest pain and leg swelling.   Gastrointestinal:  Positive for diarrhea and nausea. Negative for abdominal pain, constipation and vomiting.   Genitourinary:  Negative for dysuria, frequency and hematuria.   Neurological:  Negative for dizziness, tremors, weakness and headaches.   Psychiatric/Behavioral:  Negative for confusion. The patient is not nervous/anxious.    All other systems reviewed and are negative.      Objective   /70   Pulse 76   Temp 36.6 °C (97.9 °F)   Resp 20   Wt 88.3 kg (194 lb 9.6 oz)   SpO2 94%    Physical Exam  Constitutional:       General: She is not in acute distress.     Appearance: She is not ill-appearing.   Eyes:      Pupils: Pupils are equal, round, and reactive to light.   Cardiovascular:      Rate and Rhythm: Normal rate and regular rhythm.      Heart sounds: No murmur heard.  Pulmonary:      Effort: Pulmonary effort is normal.      Breath sounds: Wheezing and rhonchi (productive moist cough.  bilateral rhonchi.) present. No  "rales.   Abdominal:      General: Abdomen is flat. Bowel sounds are normal. There is no distension.      Palpations: Abdomen is soft. There is no mass.      Tenderness: There is no abdominal tenderness.   Musculoskeletal:         General: No swelling. Normal range of motion.   Skin:     General: Skin is warm and dry.      Findings: No rash.   Neurological:      Mental Status: She is alert and oriented to person, place, and time. Mental status is at baseline.      Comments: Right sided hemiparesis and aphasia.        No lab exists for component: \"CBC BMP\"  Assessment/Plan         Pneumonia: continue course of levaquin and mucinex.  Change aerosols to Duoneb q4 hours while awake  5 days.   Add prednisone burst treatment with prednisone 40mg daily x 5 days.  Will check bmp and cbc in AM.                  Chronic systolic congestive heart failure (CMS/HCC) I50.22       Continue torsemide 20mg and spironalactone.  Monitor labs.            CVA (cerebral vascular accident) (CMS/HCC) I63.9     Aphasia R47.01       leukocytosis     Being treated for pneumonia -  recheck labs in a couple of days    Diarrhea:   likely associated with antibiotic use.   Stool negative for C-diff.   Imodium prn for diarrhea and Zofran prn for nausea.   Seems better today.       "

## 2023-11-11 ENCOUNTER — LAB REQUISITION (OUTPATIENT)
Dept: LAB | Facility: HOSPITAL | Age: 88
End: 2023-11-11
Payer: MEDICARE

## 2023-11-11 DIAGNOSIS — J44.9 CHRONIC OBSTRUCTIVE PULMONARY DISEASE, UNSPECIFIED (MULTI): ICD-10-CM

## 2023-11-11 LAB
ANION GAP SERPL CALC-SCNC: 14 MMOL/L
BUN SERPL-MCNC: 43 MG/DL (ref 8–25)
CALCIUM SERPL-MCNC: 9 MG/DL (ref 8.5–10.4)
CHLORIDE SERPL-SCNC: 96 MMOL/L (ref 97–107)
CO2 SERPL-SCNC: 23 MMOL/L (ref 24–31)
CREAT SERPL-MCNC: 1.2 MG/DL (ref 0.4–1.6)
ERYTHROCYTE [DISTWIDTH] IN BLOOD BY AUTOMATED COUNT: 15.1 % (ref 11.5–14.5)
GFR SERPL CREATININE-BSD FRML MDRD: 42 ML/MIN/1.73M*2
GLUCOSE SERPL-MCNC: 90 MG/DL (ref 65–99)
HCT VFR BLD AUTO: 45.6 % (ref 36–46)
HGB BLD-MCNC: 14.5 G/DL (ref 12–16)
MCH RBC QN AUTO: 27.5 PG (ref 26–34)
MCHC RBC AUTO-ENTMCNC: 31.8 G/DL (ref 32–36)
MCV RBC AUTO: 87 FL (ref 80–100)
NRBC BLD-RTO: 0 /100 WBCS (ref 0–0)
PLATELET # BLD AUTO: 371 X10*3/UL (ref 150–450)
POTASSIUM SERPL-SCNC: 4.2 MMOL/L (ref 3.4–5.1)
RBC # BLD AUTO: 5.27 X10*6/UL (ref 4–5.2)
SODIUM SERPL-SCNC: 133 MMOL/L (ref 133–145)
WBC # BLD AUTO: 17.9 X10*3/UL (ref 4.4–11.3)

## 2023-11-11 PROCEDURE — 82374 ASSAY BLOOD CARBON DIOXIDE: CPT | Mod: OUT | Performed by: INTERNAL MEDICINE

## 2023-11-11 PROCEDURE — 85027 COMPLETE CBC AUTOMATED: CPT | Mod: OUT | Performed by: INTERNAL MEDICINE

## 2023-11-13 ENCOUNTER — NURSING HOME VISIT (OUTPATIENT)
Dept: POST ACUTE CARE | Facility: EXTERNAL LOCATION | Age: 88
End: 2023-11-13
Payer: MEDICARE

## 2023-11-13 ENCOUNTER — LAB REQUISITION (OUTPATIENT)
Dept: LAB | Facility: HOSPITAL | Age: 88
End: 2023-11-13
Payer: MEDICARE

## 2023-11-13 VITALS
HEART RATE: 110 BPM | RESPIRATION RATE: 18 BRPM | DIASTOLIC BLOOD PRESSURE: 71 MMHG | SYSTOLIC BLOOD PRESSURE: 100 MMHG | OXYGEN SATURATION: 95 % | TEMPERATURE: 97.6 F | WEIGHT: 194.6 LBS

## 2023-11-13 DIAGNOSIS — D72.829 LEUKOCYTOSIS, UNSPECIFIED TYPE: ICD-10-CM

## 2023-11-13 DIAGNOSIS — I50.22 CHRONIC SYSTOLIC CONGESTIVE HEART FAILURE (MULTI): ICD-10-CM

## 2023-11-13 DIAGNOSIS — N17.9 AKI (ACUTE KIDNEY INJURY) (CMS-HCC): Primary | ICD-10-CM

## 2023-11-13 DIAGNOSIS — J44.9 CHRONIC OBSTRUCTIVE PULMONARY DISEASE, UNSPECIFIED (MULTI): ICD-10-CM

## 2023-11-13 DIAGNOSIS — J18.9 PNEUMONIA OF RIGHT UPPER LOBE DUE TO INFECTIOUS ORGANISM: ICD-10-CM

## 2023-11-13 DIAGNOSIS — I63.9 CEREBROVASCULAR ACCIDENT (CVA), UNSPECIFIED MECHANISM (MULTI): ICD-10-CM

## 2023-11-13 LAB
ANION GAP SERPL CALC-SCNC: 17 MMOL/L
BUN SERPL-MCNC: 75 MG/DL (ref 8–25)
CALCIUM SERPL-MCNC: 8.6 MG/DL (ref 8.5–10.4)
CHLORIDE SERPL-SCNC: 97 MMOL/L (ref 97–107)
CO2 SERPL-SCNC: 22 MMOL/L (ref 24–31)
CREAT SERPL-MCNC: 2.6 MG/DL (ref 0.4–1.6)
ERYTHROCYTE [DISTWIDTH] IN BLOOD BY AUTOMATED COUNT: 15.1 % (ref 11.5–14.5)
GFR SERPL CREATININE-BSD FRML MDRD: 17 ML/MIN/1.73M*2
GLUCOSE SERPL-MCNC: 81 MG/DL (ref 65–99)
HCT VFR BLD AUTO: 38.8 % (ref 36–46)
HGB BLD-MCNC: 12.5 G/DL (ref 12–16)
MCH RBC QN AUTO: 27.5 PG (ref 26–34)
MCHC RBC AUTO-ENTMCNC: 32.2 G/DL (ref 32–36)
MCV RBC AUTO: 86 FL (ref 80–100)
NRBC BLD-RTO: 0 /100 WBCS (ref 0–0)
PLATELET # BLD AUTO: 361 X10*3/UL (ref 150–450)
POTASSIUM SERPL-SCNC: 3.9 MMOL/L (ref 3.4–5.1)
RBC # BLD AUTO: 4.54 X10*6/UL (ref 4–5.2)
SODIUM SERPL-SCNC: 136 MMOL/L (ref 133–145)
WBC # BLD AUTO: 20.8 X10*3/UL (ref 4.4–11.3)

## 2023-11-13 PROCEDURE — 99497 ADVNCD CARE PLAN 30 MIN: CPT | Performed by: PHYSICIAN ASSISTANT

## 2023-11-13 PROCEDURE — 82435 ASSAY OF BLOOD CHLORIDE: CPT | Mod: OUT | Performed by: INTERNAL MEDICINE

## 2023-11-13 PROCEDURE — 85027 COMPLETE CBC AUTOMATED: CPT | Mod: OUT | Performed by: INTERNAL MEDICINE

## 2023-11-13 PROCEDURE — 99309 SBSQ NF CARE MODERATE MDM 30: CPT | Performed by: PHYSICIAN ASSISTANT

## 2023-11-13 ASSESSMENT — ENCOUNTER SYMPTOMS
COUGH: 1
NAUSEA: 0
EYE DISCHARGE: 0
DYSURIA: 0
FEVER: 0
HEADACHES: 0
APPETITE CHANGE: 1
HEMATURIA: 0
FREQUENCY: 0
SHORTNESS OF BREATH: 1
ABDOMINAL PAIN: 1
VOMITING: 0
CHILLS: 0
CONFUSION: 0
DIARRHEA: 0
NERVOUS/ANXIOUS: 0
RHINORRHEA: 0
DIZZINESS: 0
CONSTIPATION: 0
EYE REDNESS: 0
WEAKNESS: 0
TREMORS: 0
WHEEZING: 0

## 2023-11-13 NOTE — PROGRESS NOTES
No chief complaint on file.      Subjective   [id number] : Leola Ward is a 89 y.o. female LTC resident at [facility].    HPI   Pt seen in follow up for pneumonia.  She is seen while resting in bed.  She does not appear to feel well.  Her respiratory status does seem better.  She does not seem to be as short of breath and she is not wheezing.  She does still have some productive cough.  She has not been eating much over the last couple of days.  She has had a decrease in urinary output.  Bladder scan was done and showed only 140cc.   She is noted to have generalized  abdominal pain on exam.   STAT KUB was done. Which did not show any signs of obstruction and NO free air.  She denies n/v/d/c.  Pt had labs today which shows that her WBC has increased to 20.8,  Her BUN and Creatinine are elevated at 75 and 2.6.   Her hemoglobin is 12.5.    Pt's daughter seen at bedside and discussed Pt condition and abnormal labs.   Explained to Daughter that she is at risk for deterioration and even death.   We discuss options including hospitalization.   Daughter reported that the family/pt do not want to return to hospital and wish to remain at facility for pt's care.   I did ask pt and she too indicated that she does not wish to return to the hospital.   Discussed Hospice care with daughter and recommended a hospice consult as Pt code status is DNRcc and family wished no hospitalization.  Family would like to focus on comfort measures, but would like us to do everything possible at the facility.        Code status is DNRcc.   Review of Systems   Constitutional:  Positive for appetite change. Negative for chills and fever.   HENT:  Negative for congestion, rhinorrhea and sneezing.    Eyes:  Negative for discharge and redness.   Respiratory:  Positive for cough and shortness of breath. Negative for wheezing.    Cardiovascular:  Negative for chest pain and leg swelling.   Gastrointestinal:  Positive for abdominal pain. Negative  "for constipation, diarrhea, nausea and vomiting.   Genitourinary:  Negative for dysuria, frequency and hematuria.   Neurological:  Negative for dizziness, tremors, weakness and headaches.   Psychiatric/Behavioral:  Negative for confusion. The patient is not nervous/anxious.    All other systems reviewed and are negative.      Objective   /71   Pulse 110   Temp 36.4 °C (97.6 °F)   Resp 18   Wt 88.3 kg (194 lb 9.6 oz)   SpO2 95%    Physical Exam  Constitutional:       General: She is not in acute distress.     Appearance: She is ill-appearing.   Eyes:      Pupils: Pupils are equal, round, and reactive to light.   Cardiovascular:      Rate and Rhythm: Normal rate and regular rhythm.      Heart sounds: No murmur heard.  Pulmonary:      Effort: Pulmonary effort is normal.      Breath sounds: No wheezing, rhonchi (productive moist cough.  bilateral rhonchi.) or rales.   Abdominal:      General: Abdomen is flat. Bowel sounds are normal. There is no distension.      Palpations: Abdomen is soft. There is no mass.      Tenderness: There is abdominal tenderness (generalized pain on exam.).   Musculoskeletal:         General: No swelling. Normal range of motion.   Skin:     General: Skin is warm and dry.      Findings: No rash.   Neurological:      Mental Status: She is alert and oriented to person, place, and time. Mental status is at baseline.      Comments: Right sided hemiparesis and aphasia.        No lab exists for component: \"CBC BMP\"  Assessment/Plan         Pneumonia:   respiratory status does seem better with routine duoneb and prednisone.  Still some productive cough.  Completing course of levaquin.                  Chronic systolic congestive heart failure (CMS/[facility]) I50.22       Continue torsemide 20mg and spironalactone - on hold due to SABA.  Monitor labs.            CVA (cerebral vascular accident) (CMS/[facility]) I63.9     Aphasia R47.01       leukocytosis     Worsening leukocytosis.   Possible due to " pneumonia or abdominal source.   STAT KUB ordered and reviewed.   Discussed labs and imaging with Dr Palacio.  We will give Zosyn x 7 days and monitor daily labs x 3 days.     SABA:    diuretics on hold.   IVF ordered NS @75cc/hr x 1 liter.   Monitor daily labs x 3 days.      Diarrhea:   resolved.   Stool negative for C-diff.   Imodium prn for diarrhea and Zofran prn for nausea.      Advanced care planning:   Pt worsening condition and at risk for deterioration and death.  Spoke with daughter and she has requested NO Hospitalization.  She is interested in hospice services for her mother which is appropriate at this time.  She wishes to discuss with her siblings and will contact social work to initiate services.   We will attempt to treat with IV antibiotics and fluids which family is in agreement with for SABA and infection. Code status is DNRcc.  Order for hospice consult written for dx of acute respiratory failure and CHF.

## 2023-11-14 ENCOUNTER — LAB REQUISITION (OUTPATIENT)
Dept: LAB | Facility: HOSPITAL | Age: 88
End: 2023-11-14
Payer: MEDICARE

## 2023-11-14 DIAGNOSIS — J44.9 CHRONIC OBSTRUCTIVE PULMONARY DISEASE, UNSPECIFIED (MULTI): ICD-10-CM

## 2023-11-14 LAB
ANION GAP SERPL CALC-SCNC: 13 MMOL/L
BUN SERPL-MCNC: 78 MG/DL (ref 8–25)
CALCIUM SERPL-MCNC: 8.4 MG/DL (ref 8.5–10.4)
CHLORIDE SERPL-SCNC: 100 MMOL/L (ref 97–107)
CO2 SERPL-SCNC: 23 MMOL/L (ref 24–31)
CREAT SERPL-MCNC: 2.3 MG/DL (ref 0.4–1.6)
ERYTHROCYTE [DISTWIDTH] IN BLOOD BY AUTOMATED COUNT: 15 % (ref 11.5–14.5)
GFR SERPL CREATININE-BSD FRML MDRD: 19 ML/MIN/1.73M*2
GLUCOSE SERPL-MCNC: 106 MG/DL (ref 65–99)
HCT VFR BLD AUTO: 37.2 % (ref 36–46)
HGB BLD-MCNC: 12 G/DL (ref 12–16)
MCH RBC QN AUTO: 27.5 PG (ref 26–34)
MCHC RBC AUTO-ENTMCNC: 32.3 G/DL (ref 32–36)
MCV RBC AUTO: 85 FL (ref 80–100)
NRBC BLD-RTO: 0 /100 WBCS (ref 0–0)
PLATELET # BLD AUTO: 334 X10*3/UL (ref 150–450)
POTASSIUM SERPL-SCNC: 3.9 MMOL/L (ref 3.4–5.1)
RBC # BLD AUTO: 4.37 X10*6/UL (ref 4–5.2)
SODIUM SERPL-SCNC: 136 MMOL/L (ref 133–145)
WBC # BLD AUTO: 23.3 X10*3/UL (ref 4.4–11.3)

## 2023-11-14 PROCEDURE — 82374 ASSAY BLOOD CARBON DIOXIDE: CPT | Mod: OUT | Performed by: INTERNAL MEDICINE

## 2023-11-14 PROCEDURE — 85027 COMPLETE CBC AUTOMATED: CPT | Mod: OUT | Performed by: INTERNAL MEDICINE

## 2023-11-15 ENCOUNTER — LAB REQUISITION (OUTPATIENT)
Dept: LAB | Facility: HOSPITAL | Age: 88
End: 2023-11-15
Payer: MEDICARE

## 2023-11-15 ENCOUNTER — NURSING HOME VISIT (OUTPATIENT)
Dept: PRIMARY CARE | Facility: CLINIC | Age: 88
End: 2023-11-15
Payer: MEDICARE

## 2023-11-15 DIAGNOSIS — R65.20 SEPSIS WITH ACUTE RENAL FAILURE WITHOUT SEPTIC SHOCK, DUE TO UNSPECIFIED ORGANISM, UNSPECIFIED ACUTE RENAL FAILURE TYPE (MULTI): Primary | ICD-10-CM

## 2023-11-15 DIAGNOSIS — I50.22 CHRONIC SYSTOLIC CONGESTIVE HEART FAILURE (MULTI): ICD-10-CM

## 2023-11-15 DIAGNOSIS — I69.351 HEMIPARESIS OF RIGHT DOMINANT SIDE AS LATE EFFECT OF CEREBRAL INFARCTION (MULTI): ICD-10-CM

## 2023-11-15 DIAGNOSIS — A41.9 SEPSIS WITH ACUTE RENAL FAILURE WITHOUT SEPTIC SHOCK, DUE TO UNSPECIFIED ORGANISM, UNSPECIFIED ACUTE RENAL FAILURE TYPE (MULTI): Primary | ICD-10-CM

## 2023-11-15 DIAGNOSIS — N17.9 SEPSIS WITH ACUTE RENAL FAILURE WITHOUT SEPTIC SHOCK, DUE TO UNSPECIFIED ORGANISM, UNSPECIFIED ACUTE RENAL FAILURE TYPE (MULTI): Primary | ICD-10-CM

## 2023-11-15 DIAGNOSIS — J18.9 PNEUMONIA OF LEFT LOWER LOBE DUE TO INFECTIOUS ORGANISM: ICD-10-CM

## 2023-11-15 DIAGNOSIS — J44.9 CHRONIC OBSTRUCTIVE PULMONARY DISEASE, UNSPECIFIED (MULTI): ICD-10-CM

## 2023-11-15 LAB
ANION GAP SERPL CALC-SCNC: 13 MMOL/L
BUN SERPL-MCNC: 76 MG/DL (ref 8–25)
CALCIUM SERPL-MCNC: 8.2 MG/DL (ref 8.5–10.4)
CHLORIDE SERPL-SCNC: 104 MMOL/L (ref 97–107)
CO2 SERPL-SCNC: 24 MMOL/L (ref 24–31)
CREAT SERPL-MCNC: 2.1 MG/DL (ref 0.4–1.6)
ERYTHROCYTE [DISTWIDTH] IN BLOOD BY AUTOMATED COUNT: 15.4 % (ref 11.5–14.5)
GFR SERPL CREATININE-BSD FRML MDRD: 21 ML/MIN/1.73M*2
GLUCOSE SERPL-MCNC: 106 MG/DL (ref 65–99)
HCT VFR BLD AUTO: 37.3 % (ref 36–46)
HGB BLD-MCNC: 12.3 G/DL (ref 12–16)
MCH RBC QN AUTO: 28.3 PG (ref 26–34)
MCHC RBC AUTO-ENTMCNC: 33 G/DL (ref 32–36)
MCV RBC AUTO: 86 FL (ref 80–100)
NRBC BLD-RTO: 0 /100 WBCS (ref 0–0)
PLATELET # BLD AUTO: 325 X10*3/UL (ref 150–450)
POTASSIUM SERPL-SCNC: 3.3 MMOL/L (ref 3.4–5.1)
RBC # BLD AUTO: 4.35 X10*6/UL (ref 4–5.2)
SODIUM SERPL-SCNC: 141 MMOL/L (ref 133–145)
WBC # BLD AUTO: 23.1 X10*3/UL (ref 4.4–11.3)

## 2023-11-15 PROCEDURE — 80048 BASIC METABOLIC PNL TOTAL CA: CPT | Mod: OUT | Performed by: INTERNAL MEDICINE

## 2023-11-15 PROCEDURE — 99309 SBSQ NF CARE MODERATE MDM 30: CPT | Performed by: INTERNAL MEDICINE

## 2023-11-15 PROCEDURE — 85027 COMPLETE CBC AUTOMATED: CPT | Mod: OUT | Performed by: INTERNAL MEDICINE

## 2023-11-15 NOTE — PROGRESS NOTES
PROGRESS NOTE      Leola Ward is a 89 y.o. female seen in follow up at [facility].    ASSESSMENT / PLAN:  Sepsis due to pneumonia  SABA on CKD, improving, likely prerenal  Diarrhea, C. Diff negative  History of stroke with right hemiparesis and aphasia    Continue the IV Zosyn, clinically improving in the kidney function is improving although she continues to have a leukocytosis.  The urine culture was negative and the stool was negative for C. difficile.  The abdomen is benign on exam and I do not see another source of infection.  We will follow the labs for now as she is improving clinically.  Daughter has confirmed that the CODE STATUS is DNR CC, no hospital transfer.    Subjective   She is being treated for pneumonia.  The white blood cell count is 23.  She had acute kidney injury and on IV fluids the creatinine is come down from 2.6-2.1.  She is aphasic, but by all reports she is feeling somewhat better.  Nurse with me today tells me that her daughter felt she was doing better yesterday as well.  Continues on the IV Zosyn and received IV fluids.  Status is DNR CC.    Objective   Vital signs reviewed in [facility].  Physical Exam  Vitals reviewed.   Constitutional:       Appearance: Normal appearance.   Cardiovascular:      Rate and Rhythm: Normal rate and regular rhythm.      Heart sounds: No murmur heard.  Pulmonary:      Breath sounds: Normal breath sounds. No wheezing, rhonchi or rales.   Musculoskeletal:      Right lower leg: No edema.      Left lower leg: No edema.     Right hemiparesis and aphasia.    Medical History as seen below has been reviewed and updated in the chart.  No past medical history on file.  No past surgical history on file.  No family history on file.  Not on File  No current outpatient medications on file prior to visit.     No current facility-administered medications on file prior to visit.       There is no immunization history on file for this patient.  Davie MASSEY  MD Hakeem

## 2023-11-16 ENCOUNTER — LAB REQUISITION (OUTPATIENT)
Dept: LAB | Facility: HOSPITAL | Age: 88
End: 2023-11-16
Payer: MEDICARE

## 2023-11-16 ENCOUNTER — NURSING HOME VISIT (OUTPATIENT)
Dept: POST ACUTE CARE | Facility: EXTERNAL LOCATION | Age: 88
End: 2023-11-16
Payer: MEDICARE

## 2023-11-16 VITALS
DIASTOLIC BLOOD PRESSURE: 69 MMHG | RESPIRATION RATE: 18 BRPM | SYSTOLIC BLOOD PRESSURE: 90 MMHG | HEART RATE: 79 BPM | OXYGEN SATURATION: 98 % | TEMPERATURE: 97.7 F | WEIGHT: 194.6 LBS

## 2023-11-16 DIAGNOSIS — I50.22 CHRONIC SYSTOLIC CONGESTIVE HEART FAILURE (MULTI): ICD-10-CM

## 2023-11-16 DIAGNOSIS — R65.20 SEPSIS WITH ACUTE RENAL FAILURE WITHOUT SEPTIC SHOCK, DUE TO UNSPECIFIED ORGANISM, UNSPECIFIED ACUTE RENAL FAILURE TYPE (MULTI): Primary | ICD-10-CM

## 2023-11-16 DIAGNOSIS — J44.9 CHRONIC OBSTRUCTIVE PULMONARY DISEASE, UNSPECIFIED (MULTI): ICD-10-CM

## 2023-11-16 DIAGNOSIS — A41.9 SEPSIS WITH ACUTE RENAL FAILURE WITHOUT SEPTIC SHOCK, DUE TO UNSPECIFIED ORGANISM, UNSPECIFIED ACUTE RENAL FAILURE TYPE (MULTI): Primary | ICD-10-CM

## 2023-11-16 DIAGNOSIS — N17.9 SEPSIS WITH ACUTE RENAL FAILURE WITHOUT SEPTIC SHOCK, DUE TO UNSPECIFIED ORGANISM, UNSPECIFIED ACUTE RENAL FAILURE TYPE (MULTI): Primary | ICD-10-CM

## 2023-11-16 DIAGNOSIS — N17.9 AKI (ACUTE KIDNEY INJURY) (CMS-HCC): ICD-10-CM

## 2023-11-16 DIAGNOSIS — I63.9 CEREBROVASCULAR ACCIDENT (CVA), UNSPECIFIED MECHANISM (MULTI): ICD-10-CM

## 2023-11-16 DIAGNOSIS — J18.9 PNEUMONIA OF RIGHT UPPER LOBE DUE TO INFECTIOUS ORGANISM: ICD-10-CM

## 2023-11-16 LAB
ANION GAP SERPL CALC-SCNC: 12 MMOL/L
BUN SERPL-MCNC: 58 MG/DL (ref 8–25)
CALCIUM SERPL-MCNC: 8.6 MG/DL (ref 8.5–10.4)
CHLORIDE SERPL-SCNC: 111 MMOL/L (ref 97–107)
CO2 SERPL-SCNC: 26 MMOL/L (ref 24–31)
CREAT SERPL-MCNC: 1.4 MG/DL (ref 0.4–1.6)
ERYTHROCYTE [DISTWIDTH] IN BLOOD BY AUTOMATED COUNT: 15.5 % (ref 11.5–14.5)
GFR SERPL CREATININE-BSD FRML MDRD: 35 ML/MIN/1.73M*2
GLUCOSE SERPL-MCNC: 105 MG/DL (ref 65–99)
HCT VFR BLD AUTO: 41.6 % (ref 36–46)
HGB BLD-MCNC: 13 G/DL (ref 12–16)
MCH RBC QN AUTO: 27.2 PG (ref 26–34)
MCHC RBC AUTO-ENTMCNC: 31.3 G/DL (ref 32–36)
MCV RBC AUTO: 87 FL (ref 80–100)
NRBC BLD-RTO: 0 /100 WBCS (ref 0–0)
PLATELET # BLD AUTO: 306 X10*3/UL (ref 150–450)
POTASSIUM SERPL-SCNC: 3.2 MMOL/L (ref 3.4–5.1)
RBC # BLD AUTO: 4.78 X10*6/UL (ref 4–5.2)
SODIUM SERPL-SCNC: 149 MMOL/L (ref 133–145)
WBC # BLD AUTO: 19.9 X10*3/UL (ref 4.4–11.3)

## 2023-11-16 PROCEDURE — 80048 BASIC METABOLIC PNL TOTAL CA: CPT | Mod: OUT | Performed by: INTERNAL MEDICINE

## 2023-11-16 PROCEDURE — 85027 COMPLETE CBC AUTOMATED: CPT | Mod: OUT | Performed by: INTERNAL MEDICINE

## 2023-11-16 PROCEDURE — 99309 SBSQ NF CARE MODERATE MDM 30: CPT | Performed by: PHYSICIAN ASSISTANT

## 2023-11-16 ASSESSMENT — ENCOUNTER SYMPTOMS
NERVOUS/ANXIOUS: 0
RHINORRHEA: 0
CONSTIPATION: 0
WEAKNESS: 0
WHEEZING: 0
CONFUSION: 0
SHORTNESS OF BREATH: 1
FREQUENCY: 0
DIZZINESS: 0
HEADACHES: 0
ABDOMINAL PAIN: 1
EYE DISCHARGE: 0
COUGH: 1
APPETITE CHANGE: 1
HEMATURIA: 0
CHILLS: 0
FEVER: 0
TREMORS: 0
VOMITING: 0
NAUSEA: 0
DYSURIA: 0
EYE REDNESS: 0
DIARRHEA: 0

## 2023-11-16 NOTE — PROGRESS NOTES
No chief complaint on file.      Subjective   [id number] : Leola Ward is a 89 y.o. female LTC resident at [facility].    HPI   Pt seen in follow up for pneumonia, sepsis with acute renal failure.  She is seen while resting in bed.  She seems to be feeling better.   Spoke with daughter who is at the bedside.  She feels that her mother is improving.  She states that she has been eating a little the last couple of days.  She seems to be breathing comfortably.   Duoneb aerosols seem to be effective for pt's respiratory symptoms.  She does not appear to be in pain.   She has no abdominal pain on exam.  No nausea, emesis, or diarrhea reported.  She has been treated with IVF and zosyn. Her usual diuretics are on hold.   Labs today show that her Kidney function is improving and creatinine is down to 1.4 (still up from baseline of 0.8).   Her Wbc is still elevated but slowly trending down at 19.9 today.    She is alert and interactive and does not appear to be in distress.   Code status is DNRcc.   Did discuss Advanced care planning with daughter and answered a few questions about hospice.  Daughter is interested in pursuing hospice care and will speak with social work.   Review of Systems   Constitutional:  Positive for appetite change. Negative for chills and fever.   HENT:  Negative for congestion, rhinorrhea and sneezing.    Eyes:  Negative for discharge and redness.   Respiratory:  Positive for cough and shortness of breath. Negative for wheezing.    Cardiovascular:  Negative for chest pain and leg swelling.   Gastrointestinal:  Positive for abdominal pain. Negative for constipation, diarrhea, nausea and vomiting.   Genitourinary:  Negative for dysuria, frequency and hematuria.   Neurological:  Negative for dizziness, tremors, weakness and headaches.   Psychiatric/Behavioral:  Negative for confusion. The patient is not nervous/anxious.    All other systems reviewed and are negative.      Objective   BP 90/69    "Pulse 79   Temp 36.5 °C (97.7 °F)   Resp 18   Wt 88.3 kg (194 lb 9.6 oz)   SpO2 98%    Physical Exam  Constitutional:       General: She is not in acute distress.     Appearance: She is not ill-appearing.   Eyes:      Pupils: Pupils are equal, round, and reactive to light.   Cardiovascular:      Rate and Rhythm: Normal rate and regular rhythm.      Heart sounds: No murmur heard.  Pulmonary:      Effort: Pulmonary effort is normal.      Breath sounds: Normal breath sounds. No wheezing, rhonchi or rales.      Comments: Lungs diminished in the bases.  No wheezing on ronchi on exam.   Abdominal:      General: Abdomen is flat. Bowel sounds are normal. There is no distension.      Palpations: Abdomen is soft. There is no mass.      Tenderness: There is no abdominal tenderness.   Musculoskeletal:         General: No swelling. Normal range of motion.   Skin:     General: Skin is warm and dry.      Findings: No rash.   Neurological:      Mental Status: She is alert and oriented to person, place, and time. Mental status is at baseline.      Comments: Right sided hemiparesis and aphasia.        No lab exists for component: \"CBC BMP\"  Assessment/Plan         Pneumonia:   respiratory symptoms seem to be improved.  She is stable on 2liters of oxygen.  We will continue [place] QID routine.                   Chronic systolic congestive heart failure (CMS/HCC) I50.22       torsemide 20mg and spironalactone - on hold due to SABA.  Will have to monitor closely as she will likely need diuretics restarted as her po intake improves.    Monitor labs.            CVA (cerebral vascular accident) (CMS/HCC) I63.9     Aphasia R47.01      Sepsis:   will continue IV zosyn.   Labs are slowly improving.  Clinically she is starting to look better.     SABA:   labs are starting to improve.  Continue to hold diuretics.        Diarrhea:   resolved.     Advanced care planning:    Code status is DNRcc.  Order for hospice consult written for dx " of acute respiratory failure and CHF.   Spoke with social work and daughter.  Plan to initiate hospice care.

## 2023-11-16 NOTE — LETTER
Patient: Leola Ward  : 1928    Encounter Date: 2023    No chief complaint on file.      Subjective  [id number] : Leola Ward is a 89 y.o. female LTC resident at [facility].    HPI   Pt seen in follow up for pneumonia, sepsis with acute renal failure.  She is seen while resting in bed.  She seems to be feeling better.   Spoke with daughter who is at the bedside.  She feels that her mother is improving.  She states that she has been eating a little the last couple of days.  She seems to be breathing comfortably.   Duoneb aerosols seem to be effective for pt's respiratory symptoms.  She does not appear to be in pain.   She has no abdominal pain on exam.  No nausea, emesis, or diarrhea reported.  She has been treated with IVF and zosyn. Her usual diuretics are on hold.   Labs today show that her Kidney function is improving and creatinine is down to 1.4 (still up from baseline of 0.8).   Her Wbc is still elevated but slowly trending down at 19.9 today.    She is alert and interactive and does not appear to be in distress.   Code status is DNRcc.   Did discuss Advanced care planning with daughter and answered a few questions about hospice.  Daughter is interested in pursuing hospice care and will speak with social work.   Review of Systems   Constitutional:  Positive for appetite change. Negative for chills and fever.   HENT:  Negative for congestion, rhinorrhea and sneezing.    Eyes:  Negative for discharge and redness.   Respiratory:  Positive for cough and shortness of breath. Negative for wheezing.    Cardiovascular:  Negative for chest pain and leg swelling.   Gastrointestinal:  Positive for abdominal pain. Negative for constipation, diarrhea, nausea and vomiting.   Genitourinary:  Negative for dysuria, frequency and hematuria.   Neurological:  Negative for dizziness, tremors, weakness and headaches.   Psychiatric/Behavioral:  Negative for confusion. The patient is not nervous/anxious.    All  "other systems reviewed and are negative.      Objective  BP 90/69   Pulse 79   Temp 36.5 °C (97.7 °F)   Resp 18   Wt 88.3 kg (194 lb 9.6 oz)   SpO2 98%    Physical Exam  Constitutional:       General: She is not in acute distress.     Appearance: She is not ill-appearing.   Eyes:      Pupils: Pupils are equal, round, and reactive to light.   Cardiovascular:      Rate and Rhythm: Normal rate and regular rhythm.      Heart sounds: No murmur heard.  Pulmonary:      Effort: Pulmonary effort is normal.      Breath sounds: Normal breath sounds. No wheezing, rhonchi or rales.      Comments: Lungs diminished in the bases.  No wheezing on ronchi on exam.   Abdominal:      General: Abdomen is flat. Bowel sounds are normal. There is no distension.      Palpations: Abdomen is soft. There is no mass.      Tenderness: There is no abdominal tenderness.   Musculoskeletal:         General: No swelling. Normal range of motion.   Skin:     General: Skin is warm and dry.      Findings: No rash.   Neurological:      Mental Status: She is alert and oriented to person, place, and time. Mental status is at baseline.      Comments: Right sided hemiparesis and aphasia.        No lab exists for component: \"CBC BMP\"  Assessment/Plan        Pneumonia:   respiratory symptoms seem to be improved.  She is stable on 2liters of oxygen.  We will continue duoneb aerososl QID routine.                   Chronic systolic congestive heart failure (CMS/[facility]) I50.22       torsemide 20mg and spironalactone - on hold due to SABA.  Will have to monitor closely as she will likely need diuretics restarted as her po intake improves.    Monitor labs.            CVA (cerebral vascular accident) (CMS/HCC) I63.9     Aphasia R47.01      Sepsis:   will continue IV zosyn.   Labs are slowly improving.  Clinically she is starting to look better.     SABA:   labs are starting to improve.  Continue to hold diuretics.        Diarrhea:   resolved.     Advanced care " planning:    Code status is DNRcc.  Order for hospice consult written for dx of acute respiratory failure and CHF.   Spoke with social work and daughter.  Plan to initiate hospice care.       Electronically Signed By: Marie Cuevas PA-C   11/16/23  3:21 PM

## 2023-11-20 ENCOUNTER — NURSING HOME VISIT (OUTPATIENT)
Dept: POST ACUTE CARE | Facility: EXTERNAL LOCATION | Age: 88
End: 2023-11-20
Payer: MEDICARE

## 2023-11-20 ENCOUNTER — LAB REQUISITION (OUTPATIENT)
Dept: LAB | Facility: HOSPITAL | Age: 88
End: 2023-11-20
Payer: MEDICARE

## 2023-11-20 VITALS
TEMPERATURE: 97.4 F | HEART RATE: 88 BPM | OXYGEN SATURATION: 98 % | WEIGHT: 194.6 LBS | DIASTOLIC BLOOD PRESSURE: 75 MMHG | RESPIRATION RATE: 20 BRPM | SYSTOLIC BLOOD PRESSURE: 128 MMHG

## 2023-11-20 DIAGNOSIS — R65.20 SEPSIS WITH ACUTE RENAL FAILURE WITHOUT SEPTIC SHOCK, DUE TO UNSPECIFIED ORGANISM, UNSPECIFIED ACUTE RENAL FAILURE TYPE (MULTI): ICD-10-CM

## 2023-11-20 DIAGNOSIS — U07.1 COVID-19: Primary | ICD-10-CM

## 2023-11-20 DIAGNOSIS — J18.9 PNEUMONIA OF RIGHT UPPER LOBE DUE TO INFECTIOUS ORGANISM: ICD-10-CM

## 2023-11-20 DIAGNOSIS — A41.9 SEPSIS WITH ACUTE RENAL FAILURE WITHOUT SEPTIC SHOCK, DUE TO UNSPECIFIED ORGANISM, UNSPECIFIED ACUTE RENAL FAILURE TYPE (MULTI): ICD-10-CM

## 2023-11-20 DIAGNOSIS — D72.829 LEUKOCYTOSIS, UNSPECIFIED TYPE: ICD-10-CM

## 2023-11-20 DIAGNOSIS — I50.22 CHRONIC SYSTOLIC CONGESTIVE HEART FAILURE (MULTI): ICD-10-CM

## 2023-11-20 DIAGNOSIS — J44.9 CHRONIC OBSTRUCTIVE PULMONARY DISEASE, UNSPECIFIED (MULTI): ICD-10-CM

## 2023-11-20 DIAGNOSIS — I63.9 CEREBROVASCULAR ACCIDENT (CVA), UNSPECIFIED MECHANISM (MULTI): ICD-10-CM

## 2023-11-20 DIAGNOSIS — E87.6 HYPOKALEMIA: ICD-10-CM

## 2023-11-20 DIAGNOSIS — N17.9 SEPSIS WITH ACUTE RENAL FAILURE WITHOUT SEPTIC SHOCK, DUE TO UNSPECIFIED ORGANISM, UNSPECIFIED ACUTE RENAL FAILURE TYPE (MULTI): ICD-10-CM

## 2023-11-20 LAB
ANION GAP SERPL CALC-SCNC: 5 MMOL/L
BUN SERPL-MCNC: 16 MG/DL (ref 8–25)
CALCIUM SERPL-MCNC: 8.5 MG/DL (ref 8.5–10.4)
CHLORIDE SERPL-SCNC: 109 MMOL/L (ref 97–107)
CO2 SERPL-SCNC: 29 MMOL/L (ref 24–31)
CREAT SERPL-MCNC: 0.8 MG/DL (ref 0.4–1.6)
ERYTHROCYTE [DISTWIDTH] IN BLOOD BY AUTOMATED COUNT: 15.5 % (ref 11.5–14.5)
GFR SERPL CREATININE-BSD FRML MDRD: 68 ML/MIN/1.73M*2
GLUCOSE SERPL-MCNC: 98 MG/DL (ref 65–99)
HCT VFR BLD AUTO: 37.8 % (ref 36–46)
HGB BLD-MCNC: 11.8 G/DL (ref 12–16)
MCH RBC QN AUTO: 27.1 PG (ref 26–34)
MCHC RBC AUTO-ENTMCNC: 31.2 G/DL (ref 32–36)
MCV RBC AUTO: 87 FL (ref 80–100)
NRBC BLD-RTO: 0 /100 WBCS (ref 0–0)
PLATELET # BLD AUTO: 220 X10*3/UL (ref 150–450)
POTASSIUM SERPL-SCNC: 2.9 MMOL/L (ref 3.4–5.1)
RBC # BLD AUTO: 4.35 X10*6/UL (ref 4–5.2)
SODIUM SERPL-SCNC: 143 MMOL/L (ref 133–145)
WBC # BLD AUTO: 20.8 X10*3/UL (ref 4.4–11.3)

## 2023-11-20 PROCEDURE — 99309 SBSQ NF CARE MODERATE MDM 30: CPT | Performed by: PHYSICIAN ASSISTANT

## 2023-11-20 PROCEDURE — 85027 COMPLETE CBC AUTOMATED: CPT | Mod: OUT | Performed by: INTERNAL MEDICINE

## 2023-11-20 PROCEDURE — 80048 BASIC METABOLIC PNL TOTAL CA: CPT | Mod: OUT | Performed by: INTERNAL MEDICINE

## 2023-11-20 ASSESSMENT — ENCOUNTER SYMPTOMS
HEADACHES: 0
FREQUENCY: 0
APPETITE CHANGE: 1
WHEEZING: 0
COUGH: 1
SHORTNESS OF BREATH: 1
NERVOUS/ANXIOUS: 0
TREMORS: 0
ABDOMINAL PAIN: 0
EYE DISCHARGE: 0
RHINORRHEA: 0
CHILLS: 0
DIZZINESS: 0
DYSURIA: 0
WEAKNESS: 0
NAUSEA: 0
VOMITING: 0
DIARRHEA: 0
HEMATURIA: 0
FEVER: 0
CONFUSION: 0
CONSTIPATION: 0
EYE REDNESS: 0

## 2023-11-20 NOTE — LETTER
Patient: Leola Ward  : 1928    Encounter Date: 2023    No chief complaint on file.      Subjective  [id number] : Leola Ward is a 89 y.o. female LTC resident at [facility].    HPI   Pt seen in follow up for pneumonia, sepsis with acute renal failure.   Pt diagnosed with covid.   She looks a little better today.  Nursing reports that she is starting to eat more.  She had labs today - kidney functions appears to be back at baseline.   Her potassium is low today at 2.9.    Her wbc is still elevated.   She is completing course of zosyn and is on antiviral for covid.   Family has met with [facility] today and has signed on for services.  Family does not want her to have any more labs drawn.  Comfort meds ordered.   Code status is DNRcc.    Review of Systems   Constitutional:  Positive for appetite change. Negative for chills and fever.   HENT:  Negative for congestion, rhinorrhea and sneezing.    Eyes:  Negative for discharge and redness.   Respiratory:  Positive for cough and shortness of breath. Negative for wheezing.    Cardiovascular:  Negative for chest pain and leg swelling.   Gastrointestinal:  Negative for abdominal pain, constipation, diarrhea, nausea and vomiting.   Genitourinary:  Negative for dysuria, frequency and hematuria.   Neurological:  Negative for dizziness, tremors, weakness and headaches.   Psychiatric/Behavioral:  Negative for confusion. The patient is not nervous/anxious.    All other systems reviewed and are negative.      Objective  /75   Pulse 88   Temp 36.3 °C (97.4 °F)   Resp 20   Wt 88.3 kg (194 lb 9.6 oz)   SpO2 98%    Physical Exam  Constitutional:       General: She is not in acute distress.     Appearance: She is not ill-appearing.   Eyes:      Pupils: Pupils are equal, round, and reactive to light.   Cardiovascular:      Rate and Rhythm: Normal rate and regular rhythm.      Heart sounds: No murmur heard.  Pulmonary:      Effort: Pulmonary effort is  "normal.      Breath sounds: Normal breath sounds. No wheezing, rhonchi or rales.      Comments: Lungs diminished in the bases.  No wheezing on ronchi on exam.   Abdominal:      General: Abdomen is flat. Bowel sounds are normal. There is no distension.      Palpations: Abdomen is soft. There is no mass.      Tenderness: There is no abdominal tenderness.   Musculoskeletal:         General: No swelling. Normal range of motion.   Skin:     General: Skin is warm and dry.      Findings: No rash.   Neurological:      Mental Status: She is alert and oriented to person, place, and time. Mental status is at baseline.      Comments: Right sided hemiparesis and aphasia.        No lab exists for component: \"CBC BMP\"  Assessment/Plan        Pneumonia:   respiratory symptoms seem to be improved.  She is stable on 2liters of oxygen.  We will continue duoneb aerososl QID routine.                   Chronic systolic congestive heart failure (CMS/HCC) I50.22       Continue to hold diuretics.  torsemide 20mg and spironalactone.   Still eating poorly - no signs of fluid overload.  Will have to monitor closely as she will likely need diuretics restarted as her po intake improves.    Monitor labs.            CVA (cerebral vascular accident) (CMS/HCC) I63.9     Aphasia R47.01      Sepsis:   complete course of zosyn.  Will remove Midline tomorrow.  WBC still elevated.   Clinically she is starting to look better.     SABA:  labs back at baseline.        Diarrhea:   resolved.     Covid:   currently on antiviral.  [facility]    Hypokalemia- potassium 40meq po daily - repeat Bmp in AM.      Advanced care planning:    Code status is DNRcc.  Order for hospice consult written for dx of acute respiratory failure and CHF.        Electronically Signed By: Marie Cuevas PA-C   11/20/23  8:12 PM  "

## 2023-11-20 NOTE — PROGRESS NOTES
No chief complaint on file.      Subjective   [id number] : Leola Ward is a 89 y.o. female LTC resident at [facility].    HPI   Pt seen in follow up for pneumonia, sepsis with acute renal failure.   Pt diagnosed with covid.   She looks a little better today.  Nursing reports that she is starting to eat more.  She had labs today - kidney functions appears to be back at baseline.   Her potassium is low today at 2.9.    Her wbc is still elevated.   She is completing course of zosyn and is on antiviral for covid.   Family has met with [facility] today and has signed on for services.  Family does not want her to have any more labs drawn.  Comfort meds ordered.   Code status is DNRcc.    Review of Systems   Constitutional:  Positive for appetite change. Negative for chills and fever.   HENT:  Negative for congestion, rhinorrhea and sneezing.    Eyes:  Negative for discharge and redness.   Respiratory:  Positive for cough and shortness of breath. Negative for wheezing.    Cardiovascular:  Negative for chest pain and leg swelling.   Gastrointestinal:  Negative for abdominal pain, constipation, diarrhea, nausea and vomiting.   Genitourinary:  Negative for dysuria, frequency and hematuria.   Neurological:  Negative for dizziness, tremors, weakness and headaches.   Psychiatric/Behavioral:  Negative for confusion. The patient is not nervous/anxious.    All other systems reviewed and are negative.      Objective   /75   Pulse 88   Temp 36.3 °C (97.4 °F)   Resp 20   Wt 88.3 kg (194 lb 9.6 oz)   SpO2 98%    Physical Exam  Constitutional:       General: She is not in acute distress.     Appearance: She is not ill-appearing.   Eyes:      Pupils: Pupils are equal, round, and reactive to light.   Cardiovascular:      Rate and Rhythm: Normal rate and regular rhythm.      Heart sounds: No murmur heard.  Pulmonary:      Effort: Pulmonary effort is normal.      Breath sounds: Normal breath sounds. No wheezing,  "rhonchi or rales.      Comments: Lungs diminished in the bases.  No wheezing on ronchi on exam.   Abdominal:      General: Abdomen is flat. Bowel sounds are normal. There is no distension.      Palpations: Abdomen is soft. There is no mass.      Tenderness: There is no abdominal tenderness.   Musculoskeletal:         General: No swelling. Normal range of motion.   Skin:     General: Skin is warm and dry.      Findings: No rash.   Neurological:      Mental Status: She is alert and oriented to person, place, and time. Mental status is at baseline.      Comments: Right sided hemiparesis and aphasia.        No lab exists for component: \"CBC BMP\"  Assessment/Plan         Pneumonia:   respiratory symptoms seem to be improved.  She is stable on 2liters of oxygen.  We will continue duoneb aerososl QID routine.                   Chronic systolic congestive heart failure (CMS/HCC) I50.22       Continue to hold diuretics.  torsemide 20mg and spironalactone.   Still eating poorly - no signs of fluid overload.  Will have to monitor closely as she will likely need diuretics restarted as her po intake improves.    Monitor labs.            CVA (cerebral vascular accident) (CMS/HCC) I63.9     Aphasia R47.01      Sepsis:   complete course of zosyn.  Will remove Midline tomorrow.  WBC still elevated.   Clinically she is starting to look better.     SABA:  labs back at baseline.        Diarrhea:   resolved.     Covid:   currently on antiviral.  [facility].    Hypokalemia- potassium 40meq po daily - repeat Bmp in AM.      Advanced care planning:    Code status is DNRcc.  Order for hospice consult written for dx of acute respiratory failure and CHF.    "

## 2023-11-21 ENCOUNTER — LAB REQUISITION (OUTPATIENT)
Dept: LAB | Facility: HOSPITAL | Age: 88
End: 2023-11-21
Payer: MEDICARE

## 2023-11-21 DIAGNOSIS — J44.9 CHRONIC OBSTRUCTIVE PULMONARY DISEASE, UNSPECIFIED (MULTI): ICD-10-CM

## 2023-11-21 LAB
ANION GAP SERPL CALC-SCNC: 4 MMOL/L
BUN SERPL-MCNC: 12 MG/DL (ref 8–25)
CALCIUM SERPL-MCNC: 8.6 MG/DL (ref 8.5–10.4)
CHLORIDE SERPL-SCNC: 108 MMOL/L (ref 97–107)
CO2 SERPL-SCNC: 31 MMOL/L (ref 24–31)
CREAT SERPL-MCNC: 0.7 MG/DL (ref 0.4–1.6)
GFR SERPL CREATININE-BSD FRML MDRD: 80 ML/MIN/1.73M*2
GLUCOSE SERPL-MCNC: 92 MG/DL (ref 65–99)
POTASSIUM SERPL-SCNC: 3.7 MMOL/L (ref 3.4–5.1)
SODIUM SERPL-SCNC: 143 MMOL/L (ref 133–145)

## 2023-11-21 PROCEDURE — 80048 BASIC METABOLIC PNL TOTAL CA: CPT | Mod: OUT | Performed by: INTERNAL MEDICINE

## 2024-01-05 ENCOUNTER — NURSING HOME VISIT (OUTPATIENT)
Dept: POST ACUTE CARE | Facility: EXTERNAL LOCATION | Age: 89
End: 2024-01-05
Payer: MEDICARE

## 2024-01-05 VITALS
OXYGEN SATURATION: 94 % | RESPIRATION RATE: 18 BRPM | TEMPERATURE: 97.8 F | WEIGHT: 198.2 LBS | DIASTOLIC BLOOD PRESSURE: 82 MMHG | SYSTOLIC BLOOD PRESSURE: 160 MMHG | HEART RATE: 82 BPM

## 2024-01-05 DIAGNOSIS — I63.9 CEREBROVASCULAR ACCIDENT (CVA), UNSPECIFIED MECHANISM (MULTI): ICD-10-CM

## 2024-01-05 DIAGNOSIS — E03.9 HYPOTHYROIDISM, UNSPECIFIED TYPE: ICD-10-CM

## 2024-01-05 DIAGNOSIS — I50.22 CHRONIC SYSTOLIC CONGESTIVE HEART FAILURE (MULTI): Primary | ICD-10-CM

## 2024-01-05 DIAGNOSIS — R47.01 APHASIA: ICD-10-CM

## 2024-01-05 DIAGNOSIS — J96.11 CHRONIC RESPIRATORY FAILURE WITH HYPOXIA (MULTI): ICD-10-CM

## 2024-01-05 PROCEDURE — 99308 SBSQ NF CARE LOW MDM 20: CPT | Performed by: PHYSICIAN ASSISTANT

## 2024-01-05 ASSESSMENT — ENCOUNTER SYMPTOMS
CHILLS: 0
NAUSEA: 0
WHEEZING: 0
HEADACHES: 0
APPETITE CHANGE: 0
DIARRHEA: 0
FEVER: 0
WEAKNESS: 0
SHORTNESS OF BREATH: 0
RHINORRHEA: 0
COUGH: 0
CONSTIPATION: 0
NERVOUS/ANXIOUS: 0
DIZZINESS: 0
VOMITING: 0
FREQUENCY: 0
EYE REDNESS: 0
CONFUSION: 0
EYE DISCHARGE: 0
ABDOMINAL PAIN: 0
DYSURIA: 0
HEMATURIA: 0
TREMORS: 0

## 2024-01-05 NOTE — PROGRESS NOTES
No chief complaint on file.      Subjective   [id number] : Leola Ward is a 89 y.o. female LTC resident at [facility].    HPI   Pt with h/o CVA with right hemiparesis and aphasia, and CHF.   Pt seen while resting in bed.  She is under the care of hospice.  She appears to be stable.   No shortness of breath or cough.  She is stable on room air.   No new issues or concerns per nursing.    She has been eating and drinking well.   Weight is stable.  Code status is DNRcc.    Review of Systems   Constitutional:  Negative for appetite change, chills and fever.   HENT:  Negative for congestion, rhinorrhea and sneezing.    Eyes:  Negative for discharge and redness.   Respiratory:  Negative for cough, shortness of breath and wheezing.    Cardiovascular:  Negative for chest pain and leg swelling.   Gastrointestinal:  Negative for abdominal pain, constipation, diarrhea, nausea and vomiting.   Genitourinary:  Negative for dysuria, frequency and hematuria.   Neurological:  Negative for dizziness, tremors, weakness and headaches.   Psychiatric/Behavioral:  Negative for confusion. The patient is not nervous/anxious.    All other systems reviewed and are negative.      Objective   /82   Pulse 82   Temp 36.6 °C (97.8 °F)   Resp 18   Wt 89.9 kg (198 lb 3.2 oz)   SpO2 94%    Physical Exam  Constitutional:       General: She is not in acute distress.     Appearance: She is not ill-appearing.   Eyes:      Pupils: Pupils are equal, round, and reactive to light.   Cardiovascular:      Rate and Rhythm: Normal rate and regular rhythm.      Heart sounds: No murmur heard.  Pulmonary:      Effort: Pulmonary effort is normal.      Breath sounds: Normal breath sounds. No wheezing, rhonchi or rales.   Abdominal:      General: Abdomen is flat. Bowel sounds are normal. There is no distension.      Palpations: Abdomen is soft. There is no mass.      Tenderness: There is no abdominal tenderness.   Musculoskeletal:         General: No  "swelling. Normal range of motion.   Skin:     General: Skin is warm and dry.      Findings: No rash.   Neurological:      Mental Status: She is alert and oriented to person, place, and time. Mental status is at baseline.      Comments: Right sided hemiparesis and aphasia.        No lab exists for component: \"CBC BMP\"  Assessment/Plan         Hypothyroidism  On synthroid.                Chronic systolic congestive heart failure (CMS/[facility]) I50.22       Continue torsemide 20mg.   Under care of [facility].   Code status DNRcc.         CVA (cerebral vascular accident) (CMS/[facility])  On apixaban.  I63.9     Aphasia R47.01   Chronic respiratory failure - uses oxygen as needed.   Currently stable on room air.       Time spent: 15min in review of chart, labs and orders, consultation with pt and documentation.   "

## 2024-01-05 NOTE — LETTER
Patient: Leola Ward  : 1928    Encounter Date: 2024    No chief complaint on file.      Subjective  [id number] : Leola Ward is a 89 y.o. female LTC resident at [facility].    HPI   Pt with h/o CVA with right hemiparesis and aphasia, and CHF.   Pt seen while resting in bed.  She is under the care of hospice.  She appears to be stable.   No shortness of breath or cough.  She is stable on room air.   No new issues or concerns per nursing.    She has been eating and drinking well.   Weight is stable.  Code status is DNRcc.    Review of Systems   Constitutional:  Negative for appetite change, chills and fever.   HENT:  Negative for congestion, rhinorrhea and sneezing.    Eyes:  Negative for discharge and redness.   Respiratory:  Negative for cough, shortness of breath and wheezing.    Cardiovascular:  Negative for chest pain and leg swelling.   Gastrointestinal:  Negative for abdominal pain, constipation, diarrhea, nausea and vomiting.   Genitourinary:  Negative for dysuria, frequency and hematuria.   Neurological:  Negative for dizziness, tremors, weakness and headaches.   Psychiatric/Behavioral:  Negative for confusion. The patient is not nervous/anxious.    All other systems reviewed and are negative.      Objective  /82   Pulse 82   Temp 36.6 °C (97.8 °F)   Resp 18   Wt 89.9 kg (198 lb 3.2 oz)   SpO2 94%    Physical Exam  Constitutional:       General: She is not in acute distress.     Appearance: She is not ill-appearing.   Eyes:      Pupils: Pupils are equal, round, and reactive to light.   Cardiovascular:      Rate and Rhythm: Normal rate and regular rhythm.      Heart sounds: No murmur heard.  Pulmonary:      Effort: Pulmonary effort is normal.      Breath sounds: Normal breath sounds. No wheezing, rhonchi or rales.   Abdominal:      General: Abdomen is flat. Bowel sounds are normal. There is no distension.      Palpations: Abdomen is soft. There is no mass.      Tenderness: There  "is no abdominal tenderness.   Musculoskeletal:         General: No swelling. Normal range of motion.   Skin:     General: Skin is warm and dry.      Findings: No rash.   Neurological:      Mental Status: She is alert and oriented to person, place, and time. Mental status is at baseline.      Comments: Right sided hemiparesis and aphasia.        No lab exists for component: \"CBC BMP\"  Assessment/Plan        Hypothyroidism  On synthroid.                Chronic systolic congestive heart failure (CMS/HCC) I50.22       Continue torsemide 20mg.   Under care of [facility] hospice.   Code status DNRcc.         CVA (cerebral vascular accident) (CMS/[facility])  On apixaban.  I63.9     Aphasia R47.01   Chronic respiratory failure - uses oxygen as needed.   Currently stable on room air.       Time spent: 15min in review of chart, labs and orders, consultation with pt and documentation.     Electronically Signed By: Marie Cuevas PA-C   1/5/24 12:30 PM  "

## 2024-01-11 ENCOUNTER — NURSING HOME VISIT (OUTPATIENT)
Dept: POST ACUTE CARE | Facility: EXTERNAL LOCATION | Age: 89
End: 2024-01-11
Payer: MEDICARE

## 2024-01-11 DIAGNOSIS — I63.9 CEREBROVASCULAR ACCIDENT (CVA), UNSPECIFIED MECHANISM (MULTI): ICD-10-CM

## 2024-01-11 DIAGNOSIS — E03.9 HYPOTHYROIDISM, UNSPECIFIED TYPE: ICD-10-CM

## 2024-01-11 DIAGNOSIS — I50.22 CHRONIC SYSTOLIC CONGESTIVE HEART FAILURE (MULTI): Primary | ICD-10-CM

## 2024-01-11 DIAGNOSIS — R47.01 APHASIA: ICD-10-CM

## 2024-01-11 DIAGNOSIS — J96.11 CHRONIC RESPIRATORY FAILURE WITH HYPOXIA (MULTI): ICD-10-CM

## 2024-01-11 PROCEDURE — 99308 SBSQ NF CARE LOW MDM 20: CPT | Performed by: INTERNAL MEDICINE

## 2024-01-11 NOTE — PROGRESS NOTES
PROGRESS NOTE      Leola Ward is a 89 y.o. female seen in follow up at [facility].    ASSESSMENT / PLAN:    CHF stable on the torsemide.  Continue nursing care palliative measures, on hospice care.    Hx stroke/aphasia.  On the apixaban for secondary prevention.      Subjective   She recovered very well from her recent illness and seems to be at a baseline.  On hospice care.  Does not express any complaints or concerns.    Objective   Vital signs reviewed in [facility].  Physical Exam  Vitals reviewed.   Constitutional:       Appearance: Normal appearance.   Cardiovascular:      Rate and Rhythm: Normal rate and regular rhythm.      Heart sounds: No murmur heard.  Pulmonary:      Breath sounds: Normal breath sounds. No wheezing, rhonchi or rales.   Musculoskeletal:      Right lower leg: No edema.      Left lower leg: No edema.     Right hemiparesis and aphasia.    Medical History as seen below has been reviewed and updated in the chart.  No past medical history on file.  No past surgical history on file.  No family history on file.  Not on File  No current outpatient medications on file prior to visit.     No current facility-administered medications on file prior to visit.       There is no immunization history on file for this patient.  Davie Palacio MD

## 2024-01-11 NOTE — LETTER
Patient: Leola Ward  : 1928    Encounter Date: 2024    PROGRESS NOTE      Leola Ward is a 89 y.o. female seen in follow up at [facility].    ASSESSMENT / PLAN:    CHF stable on the torsemide.  Continue nursing care palliative measures, on hospice care.    Hx stroke/aphasia.  On the apixaban for secondary prevention.      Subjective  She recovered very well from her recent illness and seems to be at a baseline.  On hospice care.  Does not express any complaints or concerns.    Objective  Vital signs reviewed in [facility].  Physical Exam  Vitals reviewed.   Constitutional:       Appearance: Normal appearance.   Cardiovascular:      Rate and Rhythm: Normal rate and regular rhythm.      Heart sounds: No murmur heard.  Pulmonary:      Breath sounds: Normal breath sounds. No wheezing, rhonchi or rales.   Musculoskeletal:      Right lower leg: No edema.      Left lower leg: No edema.     Right hemiparesis and aphasia.    Medical History as seen below has been reviewed and updated in the chart.  No past medical history on file.  No past surgical history on file.  No family history on file.  Not on File  No current outpatient medications on file prior to visit.     No current facility-administered medications on file prior to visit.       There is no immunization history on file for this patient.  Davie Palacio MD      Electronically Signed By: Davie Palacio MD   24  2:24 PM

## 2024-02-16 ENCOUNTER — NURSING HOME VISIT (OUTPATIENT)
Dept: POST ACUTE CARE | Facility: EXTERNAL LOCATION | Age: 89
End: 2024-02-16
Payer: MEDICARE

## 2024-02-16 VITALS
HEART RATE: 95 BPM | SYSTOLIC BLOOD PRESSURE: 106 MMHG | RESPIRATION RATE: 18 BRPM | WEIGHT: 197.1 LBS | OXYGEN SATURATION: 96 % | DIASTOLIC BLOOD PRESSURE: 61 MMHG | TEMPERATURE: 98.6 F

## 2024-02-16 DIAGNOSIS — J96.11 CHRONIC RESPIRATORY FAILURE WITH HYPOXIA (MULTI): ICD-10-CM

## 2024-02-16 DIAGNOSIS — I50.22 CHRONIC SYSTOLIC CONGESTIVE HEART FAILURE (MULTI): Primary | ICD-10-CM

## 2024-02-16 DIAGNOSIS — R47.01 APHASIA: ICD-10-CM

## 2024-02-16 DIAGNOSIS — I63.9 CEREBROVASCULAR ACCIDENT (CVA), UNSPECIFIED MECHANISM (MULTI): ICD-10-CM

## 2024-02-16 DIAGNOSIS — E03.9 HYPOTHYROIDISM, UNSPECIFIED TYPE: ICD-10-CM

## 2024-02-16 PROCEDURE — 99308 SBSQ NF CARE LOW MDM 20: CPT | Performed by: PHYSICIAN ASSISTANT

## 2024-02-16 ASSESSMENT — ENCOUNTER SYMPTOMS
EYE DISCHARGE: 0
CONSTIPATION: 0
WHEEZING: 0
VOMITING: 0
DIARRHEA: 0
FREQUENCY: 0
NERVOUS/ANXIOUS: 0
COUGH: 0
TREMORS: 0
NAUSEA: 0
EYE REDNESS: 0
APPETITE CHANGE: 0
FEVER: 0
HEMATURIA: 0
CHILLS: 0
HEADACHES: 0
SHORTNESS OF BREATH: 0
RHINORRHEA: 0
DYSURIA: 0
ABDOMINAL PAIN: 0
CONFUSION: 0
DIZZINESS: 0
WEAKNESS: 0

## 2024-02-16 NOTE — LETTER
Patient: Leola Ward  : 1928    Encounter Date: 2024    No chief complaint on file.      Subjective  [id number] : Leola Ward is a 89 y.o. female LTC resident at [facility].    HPI   Pt with h/o CVA with right hemiparesis and aphasia, and CHF.   Pt seen while resting in bed.  She is under the care of hospice.  Pt seen while resting in bed.  She is alert, pleasant and interactive.  Pt unable to provide history due to aphasia.  She is currently stable on room air.  No shortness of breath or cough.  She has been eating and drinking well.   Her weight is stable.   No new issues or concerns per nursing.    She has been eating and drinking well.  Code status is DNRcc.    Review of Systems   Constitutional:  Negative for appetite change, chills and fever.   HENT:  Negative for congestion, rhinorrhea and sneezing.    Eyes:  Negative for discharge and redness.   Respiratory:  Negative for cough, shortness of breath and wheezing.    Cardiovascular:  Negative for chest pain and leg swelling.   Gastrointestinal:  Negative for abdominal pain, constipation, diarrhea, nausea and vomiting.   Genitourinary:  Negative for dysuria, frequency and hematuria.   Neurological:  Negative for dizziness, tremors, weakness and headaches.   Psychiatric/Behavioral:  Negative for confusion. The patient is not nervous/anxious.    All other systems reviewed and are negative.      Objective  /61   Pulse 95   Temp 37 °C (98.6 °F)   Resp 18   Wt 89.4 kg (197 lb 1.6 oz)   SpO2 96%    Physical Exam  Constitutional:       General: She is not in acute distress.     Appearance: She is not ill-appearing.   Eyes:      Pupils: Pupils are equal, round, and reactive to light.   Cardiovascular:      Rate and Rhythm: Normal rate and regular rhythm.      Heart sounds: No murmur heard.  Pulmonary:      Effort: Pulmonary effort is normal.      Breath sounds: Normal breath sounds. No wheezing, rhonchi or rales.   Abdominal:      General:  "Abdomen is flat. Bowel sounds are normal. There is no distension.      Palpations: Abdomen is soft. There is no mass.      Tenderness: There is no abdominal tenderness.   Musculoskeletal:         General: No swelling. Normal range of motion.   Skin:     General: Skin is warm and dry.      Findings: No rash.   Neurological:      Mental Status: She is alert and oriented to person, place, and time. Mental status is at baseline.      Comments: Right sided hemiparesis and aphasia.        No lab exists for component: \"CBC BMP\"  Assessment/Plan        Hypothyroidism  On synthroid.                Chronic systolic congestive heart failure (CMS/HCC) I50.22       Continue torsemide 20mg.   Under care of [facility] hosp[facility].   Code status DNRcc.         CVA (cerebral vascular accident) (CMS/HCC)  On apixaban.  I63.9     Aphasia R47.01   Chronic respiratory failure - uses oxygen as needed.   Currently stable on room air.       Time spent: 15min in review of chart, labs and orders, consultation with pt and documentation.       Electronically Signed By: Marie Cuevas PA-C   2/16/24 11:35 AM  "

## 2024-02-16 NOTE — PROGRESS NOTES
No chief complaint on file.      Subjective   [id number] : Leola Ward is a 89 y.o. female LTC resident at [facility].    HPI   Pt with h/o CVA with right hemiparesis and aphasia, and CHF.   Pt seen while resting in bed.  She is under the care of hospice.  Pt seen while resting in bed.  She is alert, pleasant and interactive.  Pt unable to provide history due to aphasia.  She is currently stable on room air.  No shortness of breath or cough.  She has been eating and drinking well.   Her weight is stable.   No new issues or concerns per nursing.    She has been eating and drinking well.  Code status is DNRcc.    Review of Systems   Constitutional:  Negative for appetite change, chills and fever.   HENT:  Negative for congestion, rhinorrhea and sneezing.    Eyes:  Negative for discharge and redness.   Respiratory:  Negative for cough, shortness of breath and wheezing.    Cardiovascular:  Negative for chest pain and leg swelling.   Gastrointestinal:  Negative for abdominal pain, constipation, diarrhea, nausea and vomiting.   Genitourinary:  Negative for dysuria, frequency and hematuria.   Neurological:  Negative for dizziness, tremors, weakness and headaches.   Psychiatric/Behavioral:  Negative for confusion. The patient is not nervous/anxious.    All other systems reviewed and are negative.      Objective   /61   Pulse 95   Temp 37 °C (98.6 °F)   Resp 18   Wt 89.4 kg (197 lb 1.6 oz)   SpO2 96%    Physical Exam  Constitutional:       General: She is not in acute distress.     Appearance: She is not ill-appearing.   Eyes:      Pupils: Pupils are equal, round, and reactive to light.   Cardiovascular:      Rate and Rhythm: Normal rate and regular rhythm.      Heart sounds: No murmur heard.  Pulmonary:      Effort: Pulmonary effort is normal.      Breath sounds: Normal breath sounds. No wheezing, rhonchi or rales.   Abdominal:      General: Abdomen is flat. Bowel sounds are normal. There is no distension.     "  Palpations: Abdomen is soft. There is no mass.      Tenderness: There is no abdominal tenderness.   Musculoskeletal:         General: No swelling. Normal range of motion.   Skin:     General: Skin is warm and dry.      Findings: No rash.   Neurological:      Mental Status: She is alert and oriented to person, place, and time. Mental status is at baseline.      Comments: Right sided hemiparesis and aphasia.        No lab exists for component: \"CBC BMP\"  Assessment/Plan         Hypothyroidism  On synthroid.                Chronic systolic congestive heart failure (CMS/HCC) I50.22       Continue torsemide 20mg.   Under care of [facility] hospice.   Code status DNRcc.         CVA (cerebral vascular accident) (CMS/[facility])  On apixaban.  I63.9     Aphasia R47.01   Chronic respiratory failure - uses oxygen as needed.   Currently stable on room air.       Time spent: 15min in review of chart, labs and orders, consultation with pt and documentation.   "

## 2024-03-15 ENCOUNTER — NURSING HOME VISIT (OUTPATIENT)
Dept: POST ACUTE CARE | Facility: EXTERNAL LOCATION | Age: 89
End: 2024-03-15
Payer: MEDICARE

## 2024-03-15 VITALS
SYSTOLIC BLOOD PRESSURE: 118 MMHG | OXYGEN SATURATION: 94 % | RESPIRATION RATE: 18 BRPM | TEMPERATURE: 97.3 F | HEART RATE: 83 BPM | WEIGHT: 196.6 LBS | DIASTOLIC BLOOD PRESSURE: 61 MMHG

## 2024-03-15 DIAGNOSIS — I63.9 CEREBROVASCULAR ACCIDENT (CVA), UNSPECIFIED MECHANISM (MULTI): ICD-10-CM

## 2024-03-15 DIAGNOSIS — J96.11 CHRONIC RESPIRATORY FAILURE WITH HYPOXIA (MULTI): ICD-10-CM

## 2024-03-15 DIAGNOSIS — I50.22 CHRONIC SYSTOLIC CONGESTIVE HEART FAILURE (MULTI): ICD-10-CM

## 2024-03-15 DIAGNOSIS — J18.9 PNEUMONIA DUE TO INFECTIOUS ORGANISM, UNSPECIFIED LATERALITY, UNSPECIFIED PART OF LUNG: Primary | ICD-10-CM

## 2024-03-15 PROCEDURE — 99309 SBSQ NF CARE MODERATE MDM 30: CPT | Performed by: PHYSICIAN ASSISTANT

## 2024-03-15 ASSESSMENT — ENCOUNTER SYMPTOMS
DIZZINESS: 0
FREQUENCY: 0
CHILLS: 0
NERVOUS/ANXIOUS: 0
DYSURIA: 0
SHORTNESS OF BREATH: 0
HEMATURIA: 0
EYE DISCHARGE: 0
NAUSEA: 0
HEADACHES: 0
WEAKNESS: 0
TREMORS: 0
COUGH: 0
ABDOMINAL PAIN: 0
CONFUSION: 0
RHINORRHEA: 0
WHEEZING: 0
VOMITING: 0
APPETITE CHANGE: 0
EYE REDNESS: 0
FEVER: 0
CONSTIPATION: 0
DIARRHEA: 0

## 2024-03-15 NOTE — PROGRESS NOTES
No chief complaint on file.      Subjective   [id number] : Leola Ward is a 89 y.o. female LTC resident at [facility].    HPI   Pt with h/o CVA with right hemiparesis and aphasia, and CHF.   Called by hospice nurse to see pt for shortness of breath and wheezing.  Pt seen while resting in bed.  Nursing reports productive cough, and wheezing.  No fevers or chills.   No hypoxia.  She does seem mildly tachypneic.    She has no lower leg edema.  Lung sounds are clear.    She is alert, pleasant and interactive.   She has been eating and drinking well.   Her weight is stable.   No new issues or concerns per nursing.   Code status is DNRcc.    Review of Systems   Constitutional:  Negative for appetite change, chills and fever.   HENT:  Negative for congestion, rhinorrhea and sneezing.    Eyes:  Negative for discharge and redness.   Respiratory:  Negative for cough, shortness of breath and wheezing.    Cardiovascular:  Negative for chest pain and leg swelling.   Gastrointestinal:  Negative for abdominal pain, constipation, diarrhea, nausea and vomiting.   Genitourinary:  Negative for dysuria, frequency and hematuria.   Neurological:  Negative for dizziness, tremors, weakness and headaches.   Psychiatric/Behavioral:  Negative for confusion. The patient is not nervous/anxious.    All other systems reviewed and are negative.      Objective   /61   Pulse 83   Temp 36.3 °C (97.3 °F)   Resp 18   Wt 89.2 kg (196 lb 9.6 oz)   SpO2 94%    Physical Exam  Constitutional:       General: She is not in acute distress.     Appearance: She is not ill-appearing.   Eyes:      Pupils: Pupils are equal, round, and reactive to light.   Cardiovascular:      Rate and Rhythm: Normal rate and regular rhythm.      Heart sounds: No murmur heard.  Pulmonary:      Effort: No respiratory distress.      Breath sounds: Wheezing present. No rhonchi or rales.      Comments: Wheezing and somewhat tachypneic  Abdominal:      General: Abdomen is  "flat. Bowel sounds are normal. There is no distension.      Palpations: Abdomen is soft. There is no mass.      Tenderness: There is no abdominal tenderness.   Musculoskeletal:         General: No swelling. Normal range of motion.   Skin:     General: Skin is warm and dry.      Findings: No rash.   Neurological:      Mental Status: She is alert and oriented to person, place, and time. Mental status is at baseline.      Comments: Right sided hemiparesis and aphasia.        No lab exists for component: \"CBC BMP\"  Assessment/Plan         Hypothyroidism  On synthroid.                Chronic systolic congestive heart failure (CMS/HCC) I50.22       Continue torsemide 20mg.   Under care of [facility] hospice.   Code status DNRcc.         CVA (cerebral vascular accident) (CMS/[facility])  On apixaban.  I63.9     Aphasia R47.01   Chronic respiratory failure - uses oxygen as needed.   Currently stable on room air.     Pneumonia:  Pt with shortness of breath and cough.   Probable pneumonia given pt medical history.  Pt is under hospice care and x-ray declined.  Will start augmentin 500/125 q8 hours x 7 days,  mucinex and duoneb aerosols.        Time spent: 15min in review of chart, labs and orders, consultation with pt and documentation.   "

## 2024-03-15 NOTE — LETTER
Patient: Leola Ward  : 1928    Encounter Date: 03/15/2024    No chief complaint on file.      Subjective  [id number] : Leola Ward is a 89 y.o. female LTC resident at [facility].    HPI   Pt with h/o CVA with right hemiparesis and aphasia, and CHF.   Called by hospice nurse to see pt for shortness of breath and wheezing.  Pt seen while resting in bed.  Nursing reports productive cough, and wheezing.  No fevers or chills.   No hypoxia.  She does seem mildly tachypneic.    She has no lower leg edema.  Lung sounds are clear.    She is alert, pleasant and interactive.   She has been eating and drinking well.   Her weight is stable.   No new issues or concerns per nursing.   Code status is DNRcc.    Review of Systems   Constitutional:  Negative for appetite change, chills and fever.   HENT:  Negative for congestion, rhinorrhea and sneezing.    Eyes:  Negative for discharge and redness.   Respiratory:  Negative for cough, shortness of breath and wheezing.    Cardiovascular:  Negative for chest pain and leg swelling.   Gastrointestinal:  Negative for abdominal pain, constipation, diarrhea, nausea and vomiting.   Genitourinary:  Negative for dysuria, frequency and hematuria.   Neurological:  Negative for dizziness, tremors, weakness and headaches.   Psychiatric/Behavioral:  Negative for confusion. The patient is not nervous/anxious.    All other systems reviewed and are negative.      Objective  /61   Pulse 83   Temp 36.3 °C (97.3 °F)   Resp 18   Wt 89.2 kg (196 lb 9.6 oz)   SpO2 94%    Physical Exam  Constitutional:       General: She is not in acute distress.     Appearance: She is not ill-appearing.   Eyes:      Pupils: Pupils are equal, round, and reactive to light.   Cardiovascular:      Rate and Rhythm: Normal rate and regular rhythm.      Heart sounds: No murmur heard.  Pulmonary:      Effort: No respiratory distress.      Breath sounds: Wheezing present. No rhonchi or rales.      Comments:  "Wheezing and somewhat tachypneic  Abdominal:      General: Abdomen is flat. Bowel sounds are normal. There is no distension.      Palpations: Abdomen is soft. There is no mass.      Tenderness: There is no abdominal tenderness.   Musculoskeletal:         General: No swelling. Normal range of motion.   Skin:     General: Skin is warm and dry.      Findings: No rash.   Neurological:      Mental Status: She is alert and oriented to person, place, and time. Mental status is at baseline.      Comments: Right sided hemiparesis and aphasia.        No lab exists for component: \"CBC BMP\"  Assessment/Plan        Hypothyroidism  On synthroid.                Chronic systolic congestive heart failure (CMS/HCC) I50.22       Continue torsemide 20mg.   Under care of [facility] hospice.   Code status DNRcc.         CVA (cerebral vascular accident) (CMS/[facility])  On apixaban.  I63.9     Aphasia R47.01   Chronic respiratory failure - uses oxygen as needed.   Currently stable on room air.     Pneumonia:  Pt with shortness of breath and cough.   Probable pneumonia given pt medical history.  Pt is under hospice care and x-ray declined.  Will start augmentin 500/125 q8 hours x 7 days,  mucinex and duoneb aerosols.        Time spent: 15min in review of chart, labs and orders, consultation with pt and documentation.       Electronically Signed By: Marie Cuevas PA-C   3/15/24  4:42 PM  "

## 2024-04-11 ENCOUNTER — NURSING HOME VISIT (OUTPATIENT)
Dept: POST ACUTE CARE | Facility: EXTERNAL LOCATION | Age: 89
End: 2024-04-11
Payer: MEDICARE

## 2024-04-11 DIAGNOSIS — E03.9 HYPOTHYROIDISM, UNSPECIFIED TYPE: ICD-10-CM

## 2024-04-11 DIAGNOSIS — I50.22 CHRONIC SYSTOLIC CONGESTIVE HEART FAILURE (MULTI): ICD-10-CM

## 2024-04-11 DIAGNOSIS — J96.11 CHRONIC RESPIRATORY FAILURE WITH HYPOXIA (MULTI): ICD-10-CM

## 2024-04-11 DIAGNOSIS — R47.01 APHASIA: ICD-10-CM

## 2024-04-11 DIAGNOSIS — Z86.73 HISTORY OF STROKE: ICD-10-CM

## 2024-04-11 DIAGNOSIS — Z00.00 ANNUAL PHYSICAL EXAM: Primary | ICD-10-CM

## 2024-04-11 PROCEDURE — G0439 PPPS, SUBSEQ VISIT: HCPCS | Performed by: INTERNAL MEDICINE

## 2024-04-11 NOTE — LETTER
Patient: Leola Ward  : 1928    Encounter Date: 2024    MEDICARE WELLNESS EXAM      Leola Ward is a 89 y.o. female that is presenting today for No chief complaint on file..    Assessment/Plan   Diagnoses and all orders for this visit:  Annual physical exam  Chronic systolic congestive heart failure (CMS/HCC)  Chronic respiratory failure with hypoxia (CMS/HCC)  Aphasia  History of stroke  Hypothyroidism, unspecified type    CHF stable on the torsemide.  Continue nursing care palliative measures, on hospice care.  Hx stroke/aphasia.  On the apixaban for secondary prevention.    ADVANCED CARE PLANNING  Advanced Care Planning was discussed with patient:  The patient has an active advanced care plan on file. The patient has an active surrogate decision-maker on file.  Encouraged the patient to confirm that Living Will and Healthcare Power of  (HCPoA) are accurate and up to date.  Encouraged the patient to confirm that our office be provided a copy of any documentation in the event that anything changes.    ACTIVITIES OF DAILY LIVING  Basic ADLs:  Bathing: Completely Dependent, Dressing: Completely Dependent, Toileting: Completely Dependent, Transferring: Completely Dependent, Continence: Completely Dependent, Feeding: Completely Dependent.    Instrumental ADLs:  Ability to use phone: Completely Dependent, Shopping: Completely Dependent, Cooking: Completely Dependent, House-keeping: Completely Dependent, Laundry: Completely Dependent, Transportation: Completely Dependent, Medication Management: Completely Dependent, Finance Management: Completely Dependent.    Subjective  HPI  This patient resides at a long-term care facility.  This is an annual wellness exam.  The patient's chart, medication and other orders were reviewed in detail.  The patient's medical and functional condition has been discussed with the staff at the facility and any relative physical, occupational therapy or speech notes over  "the last several months have been reviewed.  CODE STATUS orders have been reviewed and revised if needed.  Eitel signs have been reviewed.  All pertinent laboratory studies have been reviewed as well.    Review of Systems   Unable to perform ROS: Patient nonverbal     Objective  There were no vitals filed for this visit.   There is no height or weight on file to calculate BMI.  Blood pressure 141/51, pulse 78, respirations 20, pulse ox 95% on room air, temperature 97.3 °F, weight 198.8 pounds  Physical Exam  Vitals reviewed.   Constitutional:       Appearance: Normal appearance.   Cardiovascular:      Rate and Rhythm: Normal rate and regular rhythm.      Heart sounds: No murmur heard.  Pulmonary:      Breath sounds: Normal breath sounds. No wheezing, rhonchi or rales.   Musculoskeletal:      Right lower leg: No edema.      Left lower leg: No edema.       Diagnostic Results  Lab Results   Component Value Date    GLUCOSE 92 11/21/2023    CALCIUM 8.6 11/21/2023     11/21/2023    K 3.7 11/21/2023    CO2 31 11/21/2023     (H) 11/21/2023    BUN 12 11/21/2023    CREATININE 0.70 11/21/2023     Lab Results   Component Value Date    ALT 64 (H) 07/31/2023    AST 20 07/31/2023    ALKPHOS 330 (H) 07/31/2023    BILITOT 0.4 07/31/2023     Lab Results   Component Value Date    WBC 20.8 (H) 11/20/2023    HGB 11.8 (L) 11/20/2023    HCT 37.8 11/20/2023    MCV 87 11/20/2023     11/20/2023     Lab Results   Component Value Date    CHOL 119 (L) 07/28/2023    CHOL 108 (L) 09/29/2020    CHOL 138 02/08/2020     Lab Results   Component Value Date    HDL 44 (L) 07/28/2023    HDL 42 (L) 09/29/2020    HDL 49 (L) 02/08/2020     Lab Results   Component Value Date    LDLCALC 60 (L) 07/28/2023    LDLCALC 54 (L) 09/29/2020    LDLCALC 75 02/08/2020     Lab Results   Component Value Date    TRIG 77 07/28/2023    TRIG 58 09/29/2020    TRIG 71 02/08/2020     No components found for: \"CHOLHDL\"  Lab Results   Component Value Date    " HGBA1C 5.1 04/30/2019     Other labs not included in the list above reviewed either before or during this encounter.    History  No past medical history on file.  No past surgical history on file.  No family history on file.  Social History     Socioeconomic History   • Marital status:      Spouse name: Not on file   • Number of children: Not on file   • Years of education: Not on file   • Highest education level: Not on file   Occupational History   • Not on file   Tobacco Use   • Smoking status: Not on file   • Smokeless tobacco: Not on file   Substance and Sexual Activity   • Alcohol use: Not on file   • Drug use: Not on file   • Sexual activity: Not on file   Other Topics Concern   • Not on file   Social History Narrative   • Not on file     Social Determinants of Health     Financial Resource Strain: Not on file   Food Insecurity: Not on file   Transportation Needs: Not on file   Physical Activity: Not on file   Stress: Not on file   Social Connections: Not on file   Intimate Partner Violence: Not on file   Housing Stability: Not on file     Not on File  No current outpatient medications on file prior to visit.     No current facility-administered medications on file prior to visit.       There is no immunization history on file for this patient.  Patient's medical history was reviewed and updated either before or during this encounter.     Davie Palacio MD      Electronically Signed By: Davie Palacio MD   4/11/24 11:21 AM

## 2024-04-11 NOTE — PROGRESS NOTES
MEDICARE WELLNESS EXAM      Leola Ward is a 89 y.o. female that is presenting today for No chief complaint on file..    Assessment/Plan    Diagnoses and all orders for this visit:  Annual physical exam  Chronic systolic congestive heart failure (CMS/HCC)  Chronic respiratory failure with hypoxia (CMS/HCC)  Aphasia  History of stroke  Hypothyroidism, unspecified type    CHF stable on the torsemide.  Continue nursing care palliative measures, on hospice care.  Hx stroke/aphasia.  On the apixaban for secondary prevention.    ADVANCED CARE PLANNING  Advanced Care Planning was discussed with patient:  The patient has an active advanced care plan on file. The patient has an active surrogate decision-maker on file.  Encouraged the patient to confirm that Living Will and Healthcare Power of  (HCPoA) are accurate and up to date.  Encouraged the patient to confirm that our office be provided a copy of any documentation in the event that anything changes.    ACTIVITIES OF DAILY LIVING  Basic ADLs:  Bathing: Completely Dependent, Dressing: Completely Dependent, Toileting: Completely Dependent, Transferring: Completely Dependent, Continence: Completely Dependent, Feeding: Completely Dependent.    Instrumental ADLs:  Ability to use phone: Completely Dependent, Shopping: Completely Dependent, Cooking: Completely Dependent, House-keeping: Completely Dependent, Laundry: Completely Dependent, Transportation: Completely Dependent, Medication Management: Completely Dependent, Finance Management: Completely Dependent.    Subjective   HPI  This patient resides at a long-term care facility.  This is an annual wellness exam.  The patient's chart, medication and other orders were reviewed in detail.  The patient's medical and functional condition has been discussed with the staff at the facility and any relative physical, occupational therapy or speech notes over the last several months have been reviewed.  CODE STATUS orders have  "been reviewed and revised if needed.  Eitel signs have been reviewed.  All pertinent laboratory studies have been reviewed as well.    Review of Systems   Unable to perform ROS: Patient nonverbal     Objective   There were no vitals filed for this visit.   There is no height or weight on file to calculate BMI.  Blood pressure 141/51, pulse 78, respirations 20, pulse ox 95% on room air, temperature 97.3 °F, weight 198.8 pounds  Physical Exam  Vitals reviewed.   Constitutional:       Appearance: Normal appearance.   Cardiovascular:      Rate and Rhythm: Normal rate and regular rhythm.      Heart sounds: No murmur heard.  Pulmonary:      Breath sounds: Normal breath sounds. No wheezing, rhonchi or rales.   Musculoskeletal:      Right lower leg: No edema.      Left lower leg: No edema.       Diagnostic Results   Lab Results   Component Value Date    GLUCOSE 92 11/21/2023    CALCIUM 8.6 11/21/2023     11/21/2023    K 3.7 11/21/2023    CO2 31 11/21/2023     (H) 11/21/2023    BUN 12 11/21/2023    CREATININE 0.70 11/21/2023     Lab Results   Component Value Date    ALT 64 (H) 07/31/2023    AST 20 07/31/2023    ALKPHOS 330 (H) 07/31/2023    BILITOT 0.4 07/31/2023     Lab Results   Component Value Date    WBC 20.8 (H) 11/20/2023    HGB 11.8 (L) 11/20/2023    HCT 37.8 11/20/2023    MCV 87 11/20/2023     11/20/2023     Lab Results   Component Value Date    CHOL 119 (L) 07/28/2023    CHOL 108 (L) 09/29/2020    CHOL 138 02/08/2020     Lab Results   Component Value Date    HDL 44 (L) 07/28/2023    HDL 42 (L) 09/29/2020    HDL 49 (L) 02/08/2020     Lab Results   Component Value Date    LDLCALC 60 (L) 07/28/2023    LDLCALC 54 (L) 09/29/2020    LDLCALC 75 02/08/2020     Lab Results   Component Value Date    TRIG 77 07/28/2023    TRIG 58 09/29/2020    TRIG 71 02/08/2020     No components found for: \"CHOLHDL\"  Lab Results   Component Value Date    HGBA1C 5.1 04/30/2019     Other labs not included in the list above " reviewed either before or during this encounter.    History   No past medical history on file.  No past surgical history on file.  No family history on file.  Social History     Socioeconomic History    Marital status:      Spouse name: Not on file    Number of children: Not on file    Years of education: Not on file    Highest education level: Not on file   Occupational History    Not on file   Tobacco Use    Smoking status: Not on file    Smokeless tobacco: Not on file   Substance and Sexual Activity    Alcohol use: Not on file    Drug use: Not on file    Sexual activity: Not on file   Other Topics Concern    Not on file   Social History Narrative    Not on file     Social Determinants of Health     Financial Resource Strain: Not on file   Food Insecurity: Not on file   Transportation Needs: Not on file   Physical Activity: Not on file   Stress: Not on file   Social Connections: Not on file   Intimate Partner Violence: Not on file   Housing Stability: Not on file     Not on File  No current outpatient medications on file prior to visit.     No current facility-administered medications on file prior to visit.       There is no immunization history on file for this patient.  Patient's medical history was reviewed and updated either before or during this encounter.     Davie Palacio MD   Dental

## 2024-05-09 ENCOUNTER — NURSING HOME VISIT (OUTPATIENT)
Dept: POST ACUTE CARE | Facility: EXTERNAL LOCATION | Age: 89
End: 2024-05-09
Payer: MEDICARE

## 2024-05-09 VITALS
OXYGEN SATURATION: 96 % | DIASTOLIC BLOOD PRESSURE: 71 MMHG | SYSTOLIC BLOOD PRESSURE: 135 MMHG | TEMPERATURE: 97.9 F | HEART RATE: 78 BPM | WEIGHT: 194 LBS | RESPIRATION RATE: 18 BRPM

## 2024-05-09 DIAGNOSIS — Z86.73 HISTORY OF STROKE: ICD-10-CM

## 2024-05-09 DIAGNOSIS — I50.22 CHRONIC SYSTOLIC CONGESTIVE HEART FAILURE (MULTI): ICD-10-CM

## 2024-05-09 DIAGNOSIS — L03.90 CELLULITIS, UNSPECIFIED CELLULITIS SITE: Primary | ICD-10-CM

## 2024-05-09 DIAGNOSIS — J96.11 CHRONIC RESPIRATORY FAILURE WITH HYPOXIA (MULTI): ICD-10-CM

## 2024-05-09 PROCEDURE — 99308 SBSQ NF CARE LOW MDM 20: CPT | Performed by: PHYSICIAN ASSISTANT

## 2024-05-09 ASSESSMENT — ENCOUNTER SYMPTOMS
NERVOUS/ANXIOUS: 0
CONSTIPATION: 0
EYE DISCHARGE: 0
DYSURIA: 0
CONFUSION: 0
EYE REDNESS: 0
WHEEZING: 0
TREMORS: 0
ABDOMINAL PAIN: 0
DIARRHEA: 0
HEADACHES: 0
DIZZINESS: 0
COUGH: 0
NAUSEA: 0
FREQUENCY: 0
RHINORRHEA: 0
CHILLS: 0
FEVER: 0
VOMITING: 0
SHORTNESS OF BREATH: 0
HEMATURIA: 0
WEAKNESS: 0
APPETITE CHANGE: 0

## 2024-05-09 NOTE — PROGRESS NOTES
No chief complaint on file.      Subjective   [id number] : Leola Ward is a 89 y.o. female LTC resident at [facility].    HPI   Pt with h/o CVA with right hemiparesis and aphasia, and CHF.   Asked to see pt by nursing.  Pt had covid booster given one week ago.  Her left upper arm is erythematous, tender and swollen at the injection site.   Area has gotten worse over the last week.   Pt seen while resting in bed. She is alert, pleasant and interactive.   She has been eating and drinking well.   Her weight is stable.   No shortness of breath or cough.  She is stable on room air.   No other issues per nursing.  Pt is followed by hospice.    Code status is DNRcc.    Review of Systems   Constitutional:  Negative for appetite change, chills and fever.   HENT:  Negative for congestion, rhinorrhea and sneezing.    Eyes:  Negative for discharge and redness.   Respiratory:  Negative for cough, shortness of breath and wheezing.    Cardiovascular:  Negative for chest pain and leg swelling.   Gastrointestinal:  Negative for abdominal pain, constipation, diarrhea, nausea and vomiting.   Genitourinary:  Negative for dysuria, frequency and hematuria.   Neurological:  Negative for dizziness, tremors, weakness and headaches.   Psychiatric/Behavioral:  Negative for confusion. The patient is not nervous/anxious.    All other systems reviewed and are negative.      Objective   /71   Pulse 78   Temp 36.6 °C (97.9 °F)   Resp 18   Wt 88 kg (194 lb)   SpO2 96%    Physical Exam  Constitutional:       General: She is not in acute distress.     Appearance: She is not ill-appearing.   Eyes:      Pupils: Pupils are equal, round, and reactive to light.   Cardiovascular:      Rate and Rhythm: Normal rate and regular rhythm.      Heart sounds: No murmur heard.  Pulmonary:      Effort: No respiratory distress.      Breath sounds: No wheezing, rhonchi or rales.   Abdominal:      General: Abdomen is flat. Bowel sounds are normal. There  "is no distension.      Palpations: Abdomen is soft. There is no mass.      Tenderness: There is no abdominal tenderness.   Musculoskeletal:         General: No swelling. Normal range of motion.   Skin:     General: Skin is warm and dry.      Findings: No rash.      Comments: Left upper arm with erythema, tenderness and swelling    Neurological:      Mental Status: She is alert and oriented to person, place, and time. Mental status is at baseline.      Comments: Right sided hemiparesis and aphasia.        No lab exists for component: \"CBC BMP\"  Assessment/Plan         Hypothyroidism  On synthroid.                Chronic systolic congestive heart failure (CMS/HCC) I50.22       Continue torsemide 20mg.   Under care of [facility] hosp[facility].   Code status DNRcc.         CVA (cerebral vascular accident) (CMS/[facility])  On apixaban.  I63.9     Aphasia R47.01   Chronic respiratory failure - uses oxygen as needed.   Currently stable on room air.     Cellulitis left upper arm.   Keflex 500mg BID x 5 days.      Time spent: 15min in review of chart, labs and orders, consultation with pt and documentation.   "

## 2024-05-09 NOTE — LETTER
Patient: Leola Ward  : 1928    Encounter Date: 2024    No chief complaint on file.      Subjective  [id number] : Leola Ward is a 89 y.o. female LTC resident at [facility].    HPI   Pt with h/o CVA with right hemiparesis and aphasia, and CHF.   Asked to see pt by nursing.  Pt had covid booster given one week ago.  Her left upper arm is erythematous, tender and swollen at the injection site.   Area has gotten worse over the last week.   Pt seen while resting in bed. She is alert, pleasant and interactive.   She has been eating and drinking well.   Her weight is stable.   No shortness of breath or cough.  She is stable on room air.   No other issues per nursing.  Pt is followed by hospice.    Code status is DNRcc.    Review of Systems   Constitutional:  Negative for appetite change, chills and fever.   HENT:  Negative for congestion, rhinorrhea and sneezing.    Eyes:  Negative for discharge and redness.   Respiratory:  Negative for cough, shortness of breath and wheezing.    Cardiovascular:  Negative for chest pain and leg swelling.   Gastrointestinal:  Negative for abdominal pain, constipation, diarrhea, nausea and vomiting.   Genitourinary:  Negative for dysuria, frequency and hematuria.   Neurological:  Negative for dizziness, tremors, weakness and headaches.   Psychiatric/Behavioral:  Negative for confusion. The patient is not nervous/anxious.    All other systems reviewed and are negative.      Objective  /71   Pulse 78   Temp 36.6 °C (97.9 °F)   Resp 18   Wt 88 kg (194 lb)   SpO2 96%    Physical Exam  Constitutional:       General: She is not in acute distress.     Appearance: She is not ill-appearing.   Eyes:      Pupils: Pupils are equal, round, and reactive to light.   Cardiovascular:      Rate and Rhythm: Normal rate and regular rhythm.      Heart sounds: No murmur heard.  Pulmonary:      Effort: No respiratory distress.      Breath sounds: No wheezing, rhonchi or rales.  "  Abdominal:      General: Abdomen is flat. Bowel sounds are normal. There is no distension.      Palpations: Abdomen is soft. There is no mass.      Tenderness: There is no abdominal tenderness.   Musculoskeletal:         General: No swelling. Normal range of motion.   Skin:     General: Skin is warm and dry.      Findings: No rash.      Comments: Left upper arm with erythema, tenderness and swelling    Neurological:      Mental Status: She is alert and oriented to person, place, and time. Mental status is at baseline.      Comments: Right sided hemiparesis and aphasia.        No lab exists for component: \"CBC BMP\"  Assessment/Plan        Hypothyroidism  On synthroid.                Chronic systolic congestive heart failure (CMS/HCC) I50.22       Continue torsemide 20mg.   Under care of [facility] hospice.   Code status DNRcc.         CVA (cerebral vascular accident) (CMS/[facility])  On apixaban.  I63.9     Aphasia R47.01   Chronic respiratory failure - uses oxygen as needed.   Currently stable on room air.     Cellulitis left upper arm.   Keflex 500mg BID x 5 days.      Time spent: 15min in review of chart, labs and orders, consultation with pt and documentation.       Electronically Signed By: Marie Cuevas PA-C   5/9/24  1:44 PM  "

## 2024-06-28 ENCOUNTER — NURSING HOME VISIT (OUTPATIENT)
Dept: POST ACUTE CARE | Facility: EXTERNAL LOCATION | Age: 89
End: 2024-06-28
Payer: MEDICARE

## 2024-06-28 VITALS
WEIGHT: 195 LBS | OXYGEN SATURATION: 94 % | HEART RATE: 72 BPM | SYSTOLIC BLOOD PRESSURE: 99 MMHG | RESPIRATION RATE: 18 BRPM | TEMPERATURE: 97.8 F | DIASTOLIC BLOOD PRESSURE: 45 MMHG

## 2024-06-28 DIAGNOSIS — I50.22 CHRONIC SYSTOLIC CONGESTIVE HEART FAILURE (MULTI): Primary | ICD-10-CM

## 2024-06-28 DIAGNOSIS — J44.9 CHRONIC OBSTRUCTIVE PULMONARY DISEASE, UNSPECIFIED COPD TYPE (MULTI): ICD-10-CM

## 2024-06-28 DIAGNOSIS — J96.11 CHRONIC RESPIRATORY FAILURE WITH HYPOXIA (MULTI): ICD-10-CM

## 2024-06-28 DIAGNOSIS — Z86.73 HISTORY OF STROKE: ICD-10-CM

## 2024-06-28 DIAGNOSIS — R47.01 APHASIA: ICD-10-CM

## 2024-06-28 DIAGNOSIS — E03.9 HYPOTHYROIDISM, UNSPECIFIED TYPE: ICD-10-CM

## 2024-06-28 DIAGNOSIS — I63.9 CEREBROVASCULAR ACCIDENT (CVA), UNSPECIFIED MECHANISM (MULTI): ICD-10-CM

## 2024-06-28 PROCEDURE — 99309 SBSQ NF CARE MODERATE MDM 30: CPT | Performed by: PHYSICIAN ASSISTANT

## 2024-06-28 ASSESSMENT — ENCOUNTER SYMPTOMS
NERVOUS/ANXIOUS: 0
DYSURIA: 0
APPETITE CHANGE: 0
VOMITING: 0
TREMORS: 0
CONFUSION: 0
COUGH: 0
FREQUENCY: 0
WEAKNESS: 0
FEVER: 0
CHILLS: 0
EYE DISCHARGE: 0
HEADACHES: 0
CONSTIPATION: 0
NAUSEA: 0
HEMATURIA: 0
RHINORRHEA: 0
DIZZINESS: 0
DIARRHEA: 0
ABDOMINAL PAIN: 0
SHORTNESS OF BREATH: 0
WHEEZING: 0
EYE REDNESS: 0

## 2024-06-28 NOTE — LETTER
Patient: Leola Ward  : 1928    Encounter Date: 2024    No chief complaint on file.      Subjective  [id number] : Leola Ward is a 89 y.o. female LTC resident at [facility].    HPI   Pt with h/o CVA with right hemiparesis and aphasia, and CHF.   Pt recently treated for upper respiratory infection.  She completed course of doxycycline.  She continues to be on 2liters of oxygen.  She appears to be stable.  She was seen by respiratory therapist during her illness who has recommended an inhaled steroid to manage COPD symptoms.  Pt unable to do an inhaler.   Pt went to dermatologist to have lesion removed from her hand.  Biopsy pending.    Pt seen while resting in bed. She is alert, pleasant and interactive.   She has been eating and drinking well.   Her weight is stable.   No shortness of breath or cough.  No other concerns from nursing.  Pt is followed by [facility] the [facility] hospice.    Code status is DNRcc.    Review of Systems   Constitutional:  Negative for appetite change, chills and fever.   HENT:  Negative for congestion, rhinorrhea and sneezing.    Eyes:  Negative for discharge and redness.   Respiratory:  Negative for cough, shortness of breath and wheezing.    Cardiovascular:  Negative for chest pain and leg swelling.   Gastrointestinal:  Negative for abdominal pain, constipation, diarrhea, nausea and vomiting.   Genitourinary:  Negative for dysuria, frequency and hematuria.   Neurological:  Negative for dizziness, tremors, weakness and headaches.   Psychiatric/Behavioral:  Negative for confusion. The patient is not nervous/anxious.    All other systems reviewed and are negative.      Objective  BP (!) 99/45   Pulse 72   Temp 36.6 °C (97.8 °F)   Resp 18   Wt 88.5 kg (195 lb)   SpO2 94%    Physical Exam  Constitutional:       General: She is not in acute distress.     Appearance: She is not ill-appearing.   Eyes:      Pupils: Pupils are equal, round, and reactive to light.   Cardiovascular:       "Rate and Rhythm: Normal rate and regular rhythm.      Heart sounds: No murmur heard.  Pulmonary:      Effort: No respiratory distress.      Breath sounds: No wheezing, rhonchi or rales.   Abdominal:      General: Abdomen is flat. Bowel sounds are normal. There is no distension.      Palpations: Abdomen is soft. There is no mass.      Tenderness: There is no abdominal tenderness.   Musculoskeletal:         General: No swelling. Normal range of motion.   Skin:     General: Skin is warm and dry.      Findings: No rash.      Comments: Site of skin biopsy on left hand is bandage.   No redness or drainage is noted.    Neurological:      Mental Status: She is alert and oriented to person, place, and time. Mental status is at baseline.      Comments: Right sided hemiparesis and aphasia.        No lab exists for component: \"CBC BMP\"  Assessment/Plan        Hypothyroidism  On synthroid.                Chronic systolic congestive heart failure (CMS/HCC) I50.22       Continue torsemide 20mg.   Under care of [facility] hospice.   Code status DNRcc.         CVA (cerebral vascular accident) (CMS/HCC)  On apixaban.  I63.9     Aphasia R47.01   Chronic respiratory failure - uses oxygen as needed.  Stable on 2liters    COPD:  add budesonide aerosols BID routine.  Continue routine albuterol aerosols      Time spent: 15min in review of chart, labs and orders, consultation with pt and documentation.       Electronically Signed By: Marie Cuevas PA-C   6/28/24 11:41 AM  "

## 2024-06-28 NOTE — PROGRESS NOTES
No chief complaint on file.      Subjective   [id number] : Leola Ward is a 89 y.o. female LTC resident at [facility].    HPI   Pt with h/o CVA with right hemiparesis and aphasia, and CHF.   Pt recently treated for upper respiratory infection.  She completed course of doxycycline.  She continues to be on 2liters of oxygen.  She appears to be stable.  She was seen by respiratory therapist during her illness who has recommended an inhaled steroid to manage COPD symptoms.  Pt unable to do an inhaler.   Pt went to dermatologist to have lesion removed from her hand.  Biopsy pending.    Pt seen while resting in bed. She is alert, pleasant and interactive.   She has been eating and drinking well.   Her weight is stable.   No shortness of breath or cough.  No other concerns from nursing.  Pt is followed by [facility] hospice.    Code status is DNRcc.    Review of Systems   Constitutional:  Negative for appetite change, chills and fever.   HENT:  Negative for congestion, rhinorrhea and sneezing.    Eyes:  Negative for discharge and redness.   Respiratory:  Negative for cough, shortness of breath and wheezing.    Cardiovascular:  Negative for chest pain and leg swelling.   Gastrointestinal:  Negative for abdominal pain, constipation, diarrhea, nausea and vomiting.   Genitourinary:  Negative for dysuria, frequency and hematuria.   Neurological:  Negative for dizziness, tremors, weakness and headaches.   Psychiatric/Behavioral:  Negative for confusion. The patient is not nervous/anxious.    All other systems reviewed and are negative.      Objective   BP (!) 99/45   Pulse 72   Temp 36.6 °C (97.8 °F)   Resp 18   Wt 88.5 kg (195 lb)   SpO2 94%    Physical Exam  Constitutional:       General: She is not in acute distress.     Appearance: She is not ill-appearing.   Eyes:      Pupils: Pupils are equal, round, and reactive to light.   Cardiovascular:      Rate and Rhythm: Normal rate and regular rhythm.      Heart sounds:  "No murmur heard.  Pulmonary:      Effort: No respiratory distress.      Breath sounds: No wheezing, rhonchi or rales.   Abdominal:      General: Abdomen is flat. Bowel sounds are normal. There is no distension.      Palpations: Abdomen is soft. There is no mass.      Tenderness: There is no abdominal tenderness.   Musculoskeletal:         General: No swelling. Normal range of motion.   Skin:     General: Skin is warm and dry.      Findings: No rash.      Comments: Site of skin biopsy on left hand is bandage.   No redness or drainage is noted.    Neurological:      Mental Status: She is alert and oriented to person, place, and time. Mental status is at baseline.      Comments: Right sided hemiparesis and aphasia.        No lab exists for component: \"CBC BMP\"  Assessment/Plan         Hypothyroidism  On synthroid.                Chronic systolic congestive heart failure (CMS/HCC) I50.22       Continue torsemide 20mg.   Under care of [facility] hospice.   Code status DNRcc.         CVA (cerebral vascular accident) (CMS/[facility])  On apixaban.  I63.9     Aphasia R47.01   Chronic respiratory failure - uses oxygen as needed.  Stable on 2liters    COPD:  add budesonide aerosols BID routine.  Continue routine albuterol aerosols      Time spent: 15min in review of chart, labs and orders, consultation with pt and documentation.   "

## 2024-08-09 ENCOUNTER — NURSING HOME VISIT (OUTPATIENT)
Dept: POST ACUTE CARE | Facility: EXTERNAL LOCATION | Age: 89
End: 2024-08-09
Payer: MEDICARE

## 2024-08-09 VITALS
TEMPERATURE: 98 F | DIASTOLIC BLOOD PRESSURE: 62 MMHG | RESPIRATION RATE: 18 BRPM | HEART RATE: 75 BPM | SYSTOLIC BLOOD PRESSURE: 100 MMHG | WEIGHT: 195.2 LBS | OXYGEN SATURATION: 95 %

## 2024-08-09 DIAGNOSIS — I50.22 CHRONIC SYSTOLIC CONGESTIVE HEART FAILURE (MULTI): Primary | ICD-10-CM

## 2024-08-09 DIAGNOSIS — J96.11 CHRONIC RESPIRATORY FAILURE WITH HYPOXIA (MULTI): ICD-10-CM

## 2024-08-09 DIAGNOSIS — R47.01 APHASIA: ICD-10-CM

## 2024-08-09 DIAGNOSIS — Z86.73 HISTORY OF STROKE: ICD-10-CM

## 2024-08-09 ASSESSMENT — ENCOUNTER SYMPTOMS
FREQUENCY: 0
APPETITE CHANGE: 0
EYE REDNESS: 0
NERVOUS/ANXIOUS: 0
DIARRHEA: 0
CHILLS: 0
HEADACHES: 0
SHORTNESS OF BREATH: 0
COUGH: 0
RHINORRHEA: 0
WEAKNESS: 0
VOMITING: 0
TREMORS: 0
WHEEZING: 0
CONSTIPATION: 0
DIZZINESS: 0
NAUSEA: 0
FEVER: 0
DYSURIA: 0
ABDOMINAL PAIN: 0
EYE DISCHARGE: 0
HEMATURIA: 0
CONFUSION: 0

## 2024-08-09 NOTE — PROGRESS NOTES
No chief complaint on file.      Subjective   [id number] : Leola Ward is a 89 y.o. female LTC resident at [facility].    HPI   Pt with h/o CVA with right hemiparesis and aphasia, and CHF.  Pt under care of hospice.  She is seen while resting in bed.  She has been stable.  No new issues reported by nursing.  She is stable on room air.  She continues on budesonide aerosols.  She has prn albuterol but has not needed it recently.  She has a skin cancer removed from her left hand.   The area is healing.    She is alert, pleasant and interactive.   She has been eating and drinking well.   Her weight is stable.   No shortness of breath or cough.  No other concerns from nursing.  Pt is followed by [facility] hospice.    Code status is DNRcc.    Review of Systems   Constitutional:  Negative for appetite change, chills and fever.   HENT:  Negative for congestion, rhinorrhea and sneezing.    Eyes:  Negative for discharge and redness.   Respiratory:  Negative for cough, shortness of breath and wheezing.    Cardiovascular:  Negative for chest pain and leg swelling.   Gastrointestinal:  Negative for abdominal pain, constipation, diarrhea, nausea and vomiting.   Genitourinary:  Negative for dysuria, frequency and hematuria.   Neurological:  Negative for dizziness, tremors, weakness and headaches.   Psychiatric/Behavioral:  Negative for confusion. The patient is not nervous/anxious.    All other systems reviewed and are negative.      Objective   /62   Pulse 75   Temp 36.7 °C (98 °F)   Resp 18   Wt 88.5 kg (195 lb 3.2 oz)   SpO2 95%    Physical Exam  Constitutional:       General: She is not in acute distress.     Appearance: She is not ill-appearing.   Eyes:      Pupils: Pupils are equal, round, and reactive to light.   Cardiovascular:      Rate and Rhythm: Normal rate and regular rhythm.      Heart sounds: No murmur heard.  Pulmonary:      Effort: No respiratory distress.      Breath sounds: No wheezing, rhonchi  "or rales.   Abdominal:      General: Abdomen is flat. Bowel sounds are normal. There is no distension.      Palpations: Abdomen is soft. There is no mass.      Tenderness: There is no abdominal tenderness.   Musculoskeletal:         General: No swelling. Normal range of motion.   Skin:     General: Skin is warm and dry.      Findings: No rash.      Comments: Site of skin biopsy on left hand healed  No redness or drainage is noted.    Neurological:      Mental Status: She is alert and oriented to person, place, and time. Mental status is at baseline.      Comments: Right sided hemiparesis and aphasia.        No lab exists for component: \"CBC BMP\"  Assessment/Plan         Hypothyroidism  On synthroid.                Chronic systolic congestive heart failure (CMS/[facility]) I50.22       Continue torsemide 20mg.   Under care of [facility] hospice.   Code status DNRcc.         CVA (cerebral vascular accident) (CMS/[facility])  On apixaban.  I63.9     Aphasia R47.01   Chronic respiratory failure - uses oxygen as needed.  Stable on room air.    COPD:  continue budesonide aerosols BID routine. albuterol aerosols prn.      Time spent: 15min in review of chart, labs and orders, consultation with pt and documentation.   "

## 2024-08-09 NOTE — LETTER
Patient: Leola Ward  : 1928    Encounter Date: 2024    No chief complaint on file.      Subjective  [id number] : Leola Ward is a 89 y.o. female LTC resident at [facility].    HPI   Pt with h/o CVA with right hemiparesis and aphasia, and CHF.  Pt under care of hospice.  She is seen while resting in bed.  She has been stable.  No new issues reported by nursing.  She is stable on room air.  She continues on budesonide aerosols.  She has prn albuterol but has not needed it recently.  She has a skin cancer removed from her left hand.   The area is healing.    She is alert, pleasant and interactive.   She has been eating and drinking well.   Her weight is stable.   No shortness of breath or cough.  No other concerns from nursing.  Pt is followed by [facility] hospice.    Code status is DNRcc.    Review of Systems   Constitutional:  Negative for appetite change, chills and fever.   HENT:  Negative for congestion, rhinorrhea and sneezing.    Eyes:  Negative for discharge and redness.   Respiratory:  Negative for cough, shortness of breath and wheezing.    Cardiovascular:  Negative for chest pain and leg swelling.   Gastrointestinal:  Negative for abdominal pain, constipation, diarrhea, nausea and vomiting.   Genitourinary:  Negative for dysuria, frequency and hematuria.   Neurological:  Negative for dizziness, tremors, weakness and headaches.   Psychiatric/Behavioral:  Negative for confusion. The patient is not nervous/anxious.    All other systems reviewed and are negative.      Objective  /62   Pulse 75   Temp 36.7 °C (98 °F)   Resp 18   Wt 88.5 kg (195 lb 3.2 oz)   SpO2 95%    Physical Exam  Constitutional:       General: She is not in acute distress.     Appearance: She is not ill-appearing.   Eyes:      Pupils: Pupils are equal, round, and reactive to light.   Cardiovascular:      Rate and Rhythm: Normal rate and regular rhythm.      Heart sounds: No murmur heard.  Pulmonary:      Effort:  "No respiratory distress.      Breath sounds: No wheezing, rhonchi or rales.   Abdominal:      General: Abdomen is flat. Bowel sounds are normal. There is no distension.      Palpations: Abdomen is soft. There is no mass.      Tenderness: There is no abdominal tenderness.   Musculoskeletal:         General: No swelling. Normal range of motion.   Skin:     General: Skin is warm and dry.      Findings: No rash.      Comments: Site of skin biopsy on left hand healed  No redness or drainage is noted.    Neurological:      Mental Status: She is alert and oriented to person, place, and time. Mental status is at baseline.      Comments: Right sided hemiparesis and aphasia.        No lab exists for component: \"CBC BMP\"  Assessment/Plan        Hypothyroidism  On synthroid.                Chronic systolic congestive heart failure (CMS/HCC) I50.22       Continue torsemide 20mg.   Under care of [facility] hospice.   Code status DNRcc.         CVA (cerebral vascular accident) (CMS/[facility])  On apixaban.  I63.9     Aphasia R47.01   Chronic respiratory failure - uses oxygen as needed.  Stable on room air.    COPD:  continue budesonide aerosols BID routine. albuterol aerosols prn.      Time spent: 15min in review of chart, labs and orders, consultation with pt and documentation.       Electronically Signed By: Marie Cuevas PA-C   8/9/24 11:55 AM  "

## 2024-08-26 ENCOUNTER — NURSING HOME VISIT (OUTPATIENT)
Dept: POST ACUTE CARE | Facility: EXTERNAL LOCATION | Age: 89
End: 2024-08-26
Payer: MEDICARE

## 2024-08-26 VITALS
SYSTOLIC BLOOD PRESSURE: 100 MMHG | RESPIRATION RATE: 16 BRPM | DIASTOLIC BLOOD PRESSURE: 62 MMHG | HEART RATE: 75 BPM | OXYGEN SATURATION: 96 % | WEIGHT: 195.2 LBS | TEMPERATURE: 97.8 F

## 2024-08-26 DIAGNOSIS — R10.9 ABDOMINAL PAIN, UNSPECIFIED ABDOMINAL LOCATION: ICD-10-CM

## 2024-08-26 DIAGNOSIS — Z86.73 HISTORY OF STROKE: ICD-10-CM

## 2024-08-26 DIAGNOSIS — I50.22 CHRONIC SYSTOLIC CONGESTIVE HEART FAILURE (MULTI): Primary | ICD-10-CM

## 2024-08-26 DIAGNOSIS — J96.11 CHRONIC RESPIRATORY FAILURE WITH HYPOXIA (MULTI): ICD-10-CM

## 2024-08-26 PROCEDURE — 87086 URINE CULTURE/COLONY COUNT: CPT | Mod: OUT,TRILAB,WESLAB | Performed by: INTERNAL MEDICINE

## 2024-08-26 PROCEDURE — 99309 SBSQ NF CARE MODERATE MDM 30: CPT | Performed by: PHYSICIAN ASSISTANT

## 2024-08-26 PROCEDURE — 81001 URINALYSIS AUTO W/SCOPE: CPT | Mod: OUT | Performed by: INTERNAL MEDICINE

## 2024-08-26 ASSESSMENT — ENCOUNTER SYMPTOMS
EYE DISCHARGE: 0
WHEEZING: 0
COUGH: 0
ABDOMINAL PAIN: 0
DYSURIA: 0
HEADACHES: 0
VOMITING: 0
RHINORRHEA: 0
APPETITE CHANGE: 0
CONFUSION: 0
CHILLS: 0
DIARRHEA: 0
HEMATURIA: 0
CONSTIPATION: 0
NAUSEA: 0
NERVOUS/ANXIOUS: 0
SHORTNESS OF BREATH: 0
FREQUENCY: 0
FEVER: 0
EYE REDNESS: 0
DIZZINESS: 0
TREMORS: 0
WEAKNESS: 0

## 2024-08-26 NOTE — LETTER
Patient: Leola Ward  : 1928    Encounter Date: 2024    No chief complaint on file.      Subjective  [id number] : Leola Ward is a 89 y.o. female LTC resident at [facility].    HPI   Pt with h/o CVA with right hemiparesis and aphasia, and CHF.  Pt under care of hospice.  Asked to see pt for complaint of abdominal pain.   She appears to not feel well.   Abdomen is distended.  Daughter is at bedside and states that symptoms started yesterday and seem to be worse after eating.   No vomiting.   Bowels sounds are active.   She is having 2-3 soft stools daily per nursing charting.   She has no abdominal pain on exam.  She points to mid abdomen as point of discomfort.  She is stable on room air.  She has budesonide aerosols routine which she frequently refused.  No shortness of breath or cough.  She is alert and interactive.   Her weight is stable.  Pt is followed by [facility] hospice.    Code status is DNRcc.    Review of Systems   Constitutional:  Negative for appetite change, chills and fever.   HENT:  Negative for congestion, rhinorrhea and sneezing.    Eyes:  Negative for discharge and redness.   Respiratory:  Negative for cough, shortness of breath and wheezing.    Cardiovascular:  Negative for chest pain and leg swelling.   Gastrointestinal:  Negative for abdominal pain, constipation, diarrhea, nausea and vomiting.   Genitourinary:  Negative for dysuria, frequency and hematuria.   Neurological:  Negative for dizziness, tremors, weakness and headaches.   Psychiatric/Behavioral:  Negative for confusion. The patient is not nervous/anxious.    All other systems reviewed and are negative.      Objective  /62   Pulse 75   Temp 36.6 °C (97.8 °F)   Resp 16   Wt 88.5 kg (195 lb 3.2 oz)   SpO2 96%    Physical Exam  Constitutional:       General: She is not in acute distress.     Appearance: She is not ill-appearing.   Eyes:      Pupils: Pupils are equal, round, and reactive to light.  "  Cardiovascular:      Rate and Rhythm: Normal rate and regular rhythm.      Heart sounds: No murmur heard.  Pulmonary:      Effort: No respiratory distress.      Breath sounds: No wheezing, rhonchi or rales.   Abdominal:      General: Bowel sounds are normal. There is distension.      Palpations: Abdomen is soft. There is no mass.      Tenderness: There is no abdominal tenderness. There is no guarding or rebound.   Musculoskeletal:         General: No swelling. Normal range of motion.   Skin:     General: Skin is warm and dry.      Findings: No rash.   Neurological:      Mental Status: She is alert and oriented to person, place, and time. Mental status is at baseline.      Comments: Right sided hemiparesis and aphasia.        No lab exists for component: \"CBC BMP\"  Assessment/Plan        Hypothyroidism  On synthroid.                Chronic systolic congestive heart failure (CMS/HCC) I50.22       Continue torsemide 20mg.   Under care of [facility] hospice.   Code status DNRcc.         CVA (cerebral vascular accident) (CMS/[facility])  On apixaban.  I63.9     Aphasia R47.01   Chronic respiratory failure - uses oxygen as needed.  Stable on room air.    COPD:  continue budesonide aerosols BID routine. albuterol aerosols prn.    Abdominal discomfort:  KUB stat - negative for bowel obstruction.  urine ordered.   Hospice contacted.  Zofran and mylanta given and symptoms improved.  She was able to eat some of her lunch.  Continue meds prn. Monitor.      Time spent: 30min in review of chart, labs and orders, consultation with pt and documentation.       Electronically Signed By: Marie Cuevas PA-C   8/26/24  8:57 PM  "

## 2024-08-26 NOTE — PROGRESS NOTES
No chief complaint on file.      Subjective   [id number] : Leola Ward is a 89 y.o. female LTC resident at [place].    HPI   Pt with h/o CVA with right hemiparesis and aphasia, and CHF.  Pt under care of hospice.  Asked to see pt for complaint of abdominal pain.   She appears to not feel well.   Abdomen is distended.  Daughter is at bedside and states that symptoms started yesterday and seem to be worse after eating.   No vomiting.   Bowels sounds are active.   She is having 2-3 soft stools daily per nursing charting.   She has no abdominal pain on exam.  She points to mid abdomen as point of discomfort.  She is stable on room air.  She has budesonide aerosols routine which she frequently refused.  No shortness of breath or cough.  She is alert and interactive.   Her weight is stable.  Pt is followed by [facility] hospice.    Code status is DNRcc.    Review of Systems   Constitutional:  Negative for appetite change, chills and fever.   HENT:  Negative for congestion, rhinorrhea and sneezing.    Eyes:  Negative for discharge and redness.   Respiratory:  Negative for cough, shortness of breath and wheezing.    Cardiovascular:  Negative for chest pain and leg swelling.   Gastrointestinal:  Negative for abdominal pain, constipation, diarrhea, nausea and vomiting.   Genitourinary:  Negative for dysuria, frequency and hematuria.   Neurological:  Negative for dizziness, tremors, weakness and headaches.   Psychiatric/Behavioral:  Negative for confusion. The patient is not nervous/anxious.    All other systems reviewed and are negative.      Objective   /62   Pulse 75   Temp 36.6 °C (97.8 °F)   Resp 16   Wt 88.5 kg (195 lb 3.2 oz)   SpO2 96%    Physical Exam  Constitutional:       General: She is not in acute distress.     Appearance: She is not ill-appearing.   Eyes:      Pupils: Pupils are equal, round, and reactive to light.   Cardiovascular:      Rate and Rhythm: Normal rate and regular rhythm.       "Heart sounds: No murmur heard.  Pulmonary:      Effort: No respiratory distress.      Breath sounds: No wheezing, rhonchi or rales.   Abdominal:      General: Bowel sounds are normal. There is distension.      Palpations: Abdomen is soft. There is no mass.      Tenderness: There is no abdominal tenderness. There is no guarding or rebound.   Musculoskeletal:         General: No swelling. Normal range of motion.   Skin:     General: Skin is warm and dry.      Findings: No rash.   Neurological:      Mental Status: She is alert and oriented to person, place, and time. Mental status is at baseline.      Comments: Right sided hemiparesis and aphasia.        No lab exists for component: \"CBC BMP\"  Assessment/Plan         Hypothyroidism  On synthroid.                Chronic systolic congestive heart failure (CMS/HCC) I50.22       Continue torsemide 20mg.   Under care of [facility] hospice.   Code status DNRcc.         CVA (cerebral vascular accident) (CMS/[facility])  On apixaban.  I63.9     Aphasia R47.01   Chronic respiratory failure - uses oxygen as needed.  Stable on room air.    COPD:  continue budesonide aerosols BID routine. albuterol aerosols prn.    Abdominal discomfort:  KUB stat - negative for bowel obstruction.  urine ordered.   Hospice contacted.  Zofran and mylanta given and symptoms improved.  She was able to eat some of her lunch.  Continue meds prn. Monitor.      Time spent: 30min in review of chart, labs and orders, consultation with pt and documentation.   "

## 2024-08-27 ENCOUNTER — LAB REQUISITION (OUTPATIENT)
Dept: LAB | Facility: HOSPITAL | Age: 89
End: 2024-08-27
Payer: MEDICARE

## 2024-08-27 DIAGNOSIS — N39.0 URINARY TRACT INFECTION, SITE NOT SPECIFIED: ICD-10-CM

## 2024-08-27 LAB
APPEARANCE UR: ABNORMAL
BACTERIA #/AREA URNS AUTO: ABNORMAL /HPF
BILIRUB UR STRIP.AUTO-MCNC: NEGATIVE MG/DL
COLOR UR: COLORLESS
GLUCOSE UR STRIP.AUTO-MCNC: NORMAL MG/DL
HOLD SPECIMEN: NORMAL
HYALINE CASTS #/AREA URNS AUTO: ABNORMAL /LPF
KETONES UR STRIP.AUTO-MCNC: NEGATIVE MG/DL
LEUKOCYTE ESTERASE UR QL STRIP.AUTO: ABNORMAL
MUCOUS THREADS #/AREA URNS AUTO: ABNORMAL /LPF
NITRITE UR QL STRIP.AUTO: NEGATIVE
PH UR STRIP.AUTO: 5 [PH]
PROT UR STRIP.AUTO-MCNC: NEGATIVE MG/DL
RBC # UR STRIP.AUTO: NEGATIVE /UL
RBC #/AREA URNS AUTO: ABNORMAL /HPF
SP GR UR STRIP.AUTO: 1.01
SQUAMOUS #/AREA URNS AUTO: ABNORMAL /HPF
UROBILINOGEN UR STRIP.AUTO-MCNC: NORMAL MG/DL
WBC #/AREA URNS AUTO: >50 /HPF
WBC CLUMPS #/AREA URNS AUTO: ABNORMAL /HPF

## 2024-08-29 ENCOUNTER — NURSING HOME VISIT (OUTPATIENT)
Dept: POST ACUTE CARE | Facility: EXTERNAL LOCATION | Age: 89
End: 2024-08-29
Payer: MEDICARE

## 2024-08-29 VITALS
OXYGEN SATURATION: 96 % | RESPIRATION RATE: 20 BRPM | HEART RATE: 75 BPM | DIASTOLIC BLOOD PRESSURE: 62 MMHG | WEIGHT: 195.2 LBS | TEMPERATURE: 97.8 F | SYSTOLIC BLOOD PRESSURE: 100 MMHG

## 2024-08-29 DIAGNOSIS — N39.0 ACUTE UTI: Primary | ICD-10-CM

## 2024-08-29 DIAGNOSIS — I50.22 CHRONIC SYSTOLIC CONGESTIVE HEART FAILURE (MULTI): ICD-10-CM

## 2024-08-29 DIAGNOSIS — Z86.73 HISTORY OF STROKE: ICD-10-CM

## 2024-08-29 DIAGNOSIS — J44.9 CHRONIC OBSTRUCTIVE PULMONARY DISEASE, UNSPECIFIED COPD TYPE (MULTI): ICD-10-CM

## 2024-08-29 LAB — BACTERIA UR CULT: ABNORMAL

## 2024-08-29 PROCEDURE — 99308 SBSQ NF CARE LOW MDM 20: CPT | Performed by: PHYSICIAN ASSISTANT

## 2024-08-29 ASSESSMENT — ENCOUNTER SYMPTOMS
FREQUENCY: 0
DYSURIA: 0
VOMITING: 0
EYE DISCHARGE: 0
NAUSEA: 0
WHEEZING: 0
DIARRHEA: 0
APPETITE CHANGE: 0
ABDOMINAL PAIN: 0
CHILLS: 0
CONSTIPATION: 0
TREMORS: 0
COUGH: 0
CONFUSION: 0
SHORTNESS OF BREATH: 0
HEMATURIA: 0
FEVER: 0
WEAKNESS: 0
NERVOUS/ANXIOUS: 0
DIZZINESS: 0
HEADACHES: 0
RHINORRHEA: 0
EYE REDNESS: 0

## 2024-08-29 NOTE — LETTER
Patient: Leola Ward  : 1928    Encounter Date: 2024    No chief complaint on file.      Subjective  [id number] : Leola Ward is a 89 y.o. female LTC resident at [facility].    HPI   Pt with h/o CVA with right hemiparesis and aphasia, and CHF.  Pt under care of hospice.   Pt seen in follow up for abdominal pain.   Pt seen while resting in bed with daughter at the bedside.   Pt appears to be feeling much better.  Urinalysis was positive for UTI and she has been started on keflex.   She denies any abdominal pain.   She is eating and drinking well.  No reported nausea or emesis.  She has no abdominal pain on exam.  No shortness of breath or cough.  She is alert and interactive.   Her weight is stable.  Pt is followed by [facility] hospice.    Code status is DNRcc.    Review of Systems   Constitutional:  Negative for appetite change, chills and fever.   HENT:  Negative for congestion, rhinorrhea and sneezing.    Eyes:  Negative for discharge and redness.   Respiratory:  Negative for cough, shortness of breath and wheezing.    Cardiovascular:  Negative for chest pain and leg swelling.   Gastrointestinal:  Negative for abdominal pain, constipation, diarrhea, nausea and vomiting.   Genitourinary:  Negative for dysuria, frequency and hematuria.   Neurological:  Negative for dizziness, tremors, weakness and headaches.   Psychiatric/Behavioral:  Negative for confusion. The patient is not nervous/anxious.    All other systems reviewed and are negative.      Objective  /62   Pulse 75   Temp 36.6 °C (97.8 °F)   Resp 20   Wt 88.5 kg (195 lb 3.2 oz)   SpO2 96%    Physical Exam  Constitutional:       General: She is not in acute distress.     Appearance: She is not ill-appearing.   Eyes:      Pupils: Pupils are equal, round, and reactive to light.   Cardiovascular:      Rate and Rhythm: Normal rate and regular rhythm.      Heart sounds: No murmur heard.  Pulmonary:      Effort: No respiratory distress.       Breath sounds: No wheezing, rhonchi or rales.   Abdominal:      General: Bowel sounds are normal. There is distension.      Palpations: Abdomen is soft. There is no mass.      Tenderness: There is no abdominal tenderness. There is no guarding or rebound.   Musculoskeletal:         General: No swelling. Normal range of motion.   Skin:     General: Skin is warm and dry.      Findings: No rash.   Neurological:      Mental Status: She is alert and oriented to person, place, and time. Mental status is at baseline.      Comments: Right sided hemiparesis and aphasia.        Results for orders placed or performed in visit on 08/26/24 (from the past 96 hour(s))   Urinalysis with Reflex Culture and Microscopic   Result Value Ref Range    Color, Urine Colorless (N) Light-Yellow, Yellow, Dark-Yellow    Appearance, Urine Turbid (N) Clear    Specific Gravity, Urine 1.006 1.005 - 1.035    pH, Urine 5.0 5.0, 5.5, 6.0, 6.5, 7.0, 7.5, 8.0    Protein, Urine NEGATIVE NEGATIVE, 10 (TRACE), 20 (TRACE) mg/dL    Glucose, Urine Normal Normal mg/dL    Blood, Urine NEGATIVE NEGATIVE    Ketones, Urine NEGATIVE NEGATIVE mg/dL    Bilirubin, Urine NEGATIVE NEGATIVE    Urobilinogen, Urine Normal Normal mg/dL    Nitrite, Urine NEGATIVE NEGATIVE    Leukocyte Esterase, Urine 500 Renata/µL (A) NEGATIVE   Extra Urine Gray Tube   Result Value Ref Range    Extra Tube Hold for add-ons.    Microscopic Only, Urine   Result Value Ref Range    WBC, Urine >50 (A) 1-5, NONE /HPF    WBC Clumps, Urine OCCASIONAL Reference range not established. /HPF    RBC, Urine 3-5 NONE, 1-2, 3-5 /HPF    Squamous Epithelial Cells, Urine 1-9 (SPARSE) Reference range not established. /HPF    Bacteria, Urine 2+ (A) NONE SEEN /HPF    Mucus, Urine FEW Reference range not established. /LPF    Hyaline Casts, Urine 1+ (A) NONE /LPF   Urine Culture    Specimen: Straight Catheter; Urine   Result Value Ref Range    Urine Culture >100,000 Klebsiella oxytoca/Raoultella species (A)         "Susceptibility    Klebsiella oxytoca/Raoultella species - MICROSCAN     Ampicillin  Resistant ug/mL     Amoxicillin/Clavulanate  Susceptible ug/mL     Ampicillin/Sulbactam  Susceptible ug/mL     Cefazolin  Susceptible ug/mL     Ciprofloxacin  Susceptible ug/mL     Gentamicin  Susceptible ug/mL     Nitrofurantoin  Susceptible ug/mL     Piperacillin/Tazobactam  Susceptible ug/mL     Trimethoprim/Sulfamethoxazole  Susceptible ug/mL      No lab exists for component: \"CBC BMP\"  Assessment/Plan        Hypothyroidism  On synthroid.                Chronic systolic congestive heart failure (CMS/HCC) I50.22       Continue torsemide 20mg.   Under care of [facility] hospice.   Code status DNRcc.         CVA (cerebral vascular accident) (CMS/[facility])  On apixaban.  I63.9     Aphasia R47.01   Chronic respiratory failure - uses oxygen as needed.  Stable on room air.    COPD:  continue budesonide aerosols BID routine. albuterol aerosols prn.    Acute UTI:  started on keflex 500mg BID for 7 days.  Symptoms have improved since starting the antibiotics.    Urine culture reviewed today - showed klebsiella susceptible to cephalosporines -  symptoms improved - continue keflex.       Time spent: 15 min in review of chart, labs and orders, consultation with pt and documentation.       Electronically Signed By: Marie Cuevas PA-C   8/29/24  4:48 PM  "

## 2024-08-29 NOTE — PROGRESS NOTES
No chief complaint on file.      Subjective   [id number] : Leola Ward is a 89 y.o. female LTC resident at [facility].    HPI   Pt with h/o CVA with right hemiparesis and aphasia, and CHF.  Pt under care of hospice.   Pt seen in follow up for abdominal pain.   Pt seen while resting in bed with daughter at the bedside.   Pt appears to be feeling much better.  Urinalysis was positive for UTI and she has been started on keflex.   She denies any abdominal pain.   She is eating and drinking well.  No reported nausea or emesis.  She has no abdominal pain on exam.  No shortness of breath or cough.  She is alert and interactive.   Her weight is stable.  Pt is followed by [facility].    Code status is DNRcc.    Review of Systems   Constitutional:  Negative for appetite change, chills and fever.   HENT:  Negative for congestion, rhinorrhea and sneezing.    Eyes:  Negative for discharge and redness.   Respiratory:  Negative for cough, shortness of breath and wheezing.    Cardiovascular:  Negative for chest pain and leg swelling.   Gastrointestinal:  Negative for abdominal pain, constipation, diarrhea, nausea and vomiting.   Genitourinary:  Negative for dysuria, frequency and hematuria.   Neurological:  Negative for dizziness, tremors, weakness and headaches.   Psychiatric/Behavioral:  Negative for confusion. The patient is not nervous/anxious.    All other systems reviewed and are negative.      Objective   /62   Pulse 75   Temp 36.6 °C (97.8 °F)   Resp 20   Wt 88.5 kg (195 lb 3.2 oz)   SpO2 96%    Physical Exam  Constitutional:       General: She is not in acute distress.     Appearance: She is not ill-appearing.   Eyes:      Pupils: Pupils are equal, round, and reactive to light.   Cardiovascular:      Rate and Rhythm: Normal rate and regular rhythm.      Heart sounds: No murmur heard.  Pulmonary:      Effort: No respiratory distress.      Breath sounds: No wheezing, rhonchi or rales.   Abdominal:       General: Bowel sounds are normal. There is distension.      Palpations: Abdomen is soft. There is no mass.      Tenderness: There is no abdominal tenderness. There is no guarding or rebound.   Musculoskeletal:         General: No swelling. Normal range of motion.   Skin:     General: Skin is warm and dry.      Findings: No rash.   Neurological:      Mental Status: She is alert and oriented to person, place, and time. Mental status is at baseline.      Comments: Right sided hemiparesis and aphasia.        Results for orders placed or performed in visit on 08/26/24 (from the past 96 hour(s))   Urinalysis with Reflex Culture and Microscopic   Result Value Ref Range    Color, Urine Colorless (N) Light-Yellow, Yellow, Dark-Yellow    Appearance, Urine Turbid (N) Clear    Specific Gravity, Urine 1.006 1.005 - 1.035    pH, Urine 5.0 5.0, 5.5, 6.0, 6.5, 7.0, 7.5, 8.0    Protein, Urine NEGATIVE NEGATIVE, 10 (TRACE), 20 (TRACE) mg/dL    Glucose, Urine Normal Normal mg/dL    Blood, Urine NEGATIVE NEGATIVE    Ketones, Urine NEGATIVE NEGATIVE mg/dL    Bilirubin, Urine NEGATIVE NEGATIVE    Urobilinogen, Urine Normal Normal mg/dL    Nitrite, Urine NEGATIVE NEGATIVE    Leukocyte Esterase, Urine 500 Renata/µL (A) NEGATIVE   Extra Urine Gray Tube   Result Value Ref Range    Extra Tube Hold for add-ons.    Microscopic Only, Urine   Result Value Ref Range    WBC, Urine >50 (A) 1-5, NONE /HPF    WBC Clumps, Urine OCCASIONAL Reference range not established. /HPF    RBC, Urine 3-5 NONE, 1-2, 3-5 /HPF    Squamous Epithelial Cells, Urine 1-9 (SPARSE) Reference range not established. /HPF    Bacteria, Urine 2+ (A) NONE SEEN /HPF    Mucus, Urine FEW Reference range not established. /LPF    Hyaline Casts, Urine 1+ (A) NONE /LPF   Urine Culture    Specimen: Straight Catheter; Urine   Result Value Ref Range    Urine Culture >100,000 Klebsiella oxytoca/Raoultella species (A)        Susceptibility    Klebsiella oxytoca/Raoultella species - MICROSCAN  "    Ampicillin  Resistant ug/mL     Amoxicillin/Clavulanate  Susceptible ug/mL     Ampicillin/Sulbactam  Susceptible ug/mL     Cefazolin  Susceptible ug/mL     Ciprofloxacin  Susceptible ug/mL     Gentamicin  Susceptible ug/mL     Nitrofurantoin  Susceptible ug/mL     Piperacillin/Tazobactam  Susceptible ug/mL     Trimethoprim/Sulfamethoxazole  Susceptible ug/mL      No lab exists for component: \"CBC BMP\"  Assessment/Plan         Hypothyroidism  On synthroid.                Chronic systolic congestive heart failure (CMS/HCC) I50.22       Continue torsemide 20mg.   Under care of [facility] hospice.   Code status DNRcc.         CVA (cerebral vascular accident) (CMS/[facility])  On apixaban.  I63.9     Aphasia R47.01   Chronic respiratory failure - uses oxygen as needed.  Stable on room air.    COPD:  continue budesonide aerosols BID routine. albuterol aerosols prn.    Acute UTI:  started on keflex 500mg BID for 7 days.  Symptoms have improved since starting the antibiotics.    Urine culture reviewed today - showed klebsiella susceptible to cephalosporines -  symptoms improved - continue keflex.       Time spent: 15 min in review of chart, labs and orders, consultation with pt and documentation.   "

## 2024-09-12 ENCOUNTER — NURSING HOME VISIT (OUTPATIENT)
Dept: POST ACUTE CARE | Facility: EXTERNAL LOCATION | Age: 89
End: 2024-09-12
Payer: MEDICARE

## 2024-09-12 DIAGNOSIS — I50.22 CHRONIC SYSTOLIC CONGESTIVE HEART FAILURE (MULTI): ICD-10-CM

## 2024-09-12 DIAGNOSIS — J96.11 CHRONIC RESPIRATORY FAILURE WITH HYPOXIA (MULTI): ICD-10-CM

## 2024-09-12 DIAGNOSIS — Z86.73 HISTORY OF STROKE: Primary | ICD-10-CM

## 2024-09-12 NOTE — LETTER
Patient: Leola Ward  : 1928    Encounter Date: 2024    PROGRESS NOTE      Leola Ward is a 89 y.o. female seen in follow up at [facility].    ASSESSMENT / PLAN:    Chronic systolic congestive heart failure (CMS/HCC)  Chronic respiratory failure with hypoxia (CMS/HCC)  Aphasia  History of stroke  Hypothyroidism, unspecified type    CHF stable on the torsemide.  Continue nursing measures.  Hx stroke/aphasia.  On the apixaban for secondary prevention.    Subjective  This is a 89 y.o. female who resides at at long term care facility, seen today for routine follow up.  The nursing facility documentation is reviewed including orders.  There are currently no concerns over adverse reactions to the prescribed medications.  I reviewed any pertinent laboratory studies.  I discussed the patient's condition with the staff, including current functional status.     Objective  Vital signs reviewed in [facility].  Physical Exam  Vitals reviewed.   Constitutional:       Appearance: Normal appearance.   Cardiovascular:      Rate and Rhythm: Normal rate and regular rhythm.      Heart sounds: No murmur heard.  Pulmonary:      Breath sounds: Normal breath sounds. No wheezing, rhonchi or rales.   Musculoskeletal:      Right lower leg: No edema.      Left lower leg: No edema.     Right hemiparesis and aphasia.    Medical History as seen below has been reviewed and updated in the chart.  No past medical history on file.  No past surgical history on file.  No family history on file.  Not on File  No current outpatient medications on file prior to visit.     No current facility-administered medications on file prior to visit.       There is no immunization history on file for this patient.  Davie Palacio MD      Electronically Signed By: Davie Palacio MD   24 10:32 AM

## 2024-09-13 NOTE — PROGRESS NOTES
PROGRESS NOTE      Leola Ward is a 89 y.o. female seen in follow up at [facility].    ASSESSMENT / PLAN:    Chronic systolic congestive heart failure (CMS/HCC)  Chronic respiratory failure with hypoxia (CMS/HCC)  Aphasia  History of stroke  Hypothyroidism, unspecified type    CHF stable on the torsemide.  Continue nursing measures.  Hx stroke/aphasia.  On the apixaban for secondary prevention.    Subjective   This is a 89 y.o. female who resides at at long term care facility, seen today for routine follow up.  The nursing facility documentation is reviewed including orders.  There are currently no concerns over adverse reactions to the prescribed medications.  I reviewed any pertinent laboratory studies.  I discussed the patient's condition with the staff, including current functional status.     Objective   Vital signs reviewed in [facility].  Physical Exam  Vitals reviewed.   Constitutional:       Appearance: Normal appearance.   Cardiovascular:      Rate and Rhythm: Normal rate and regular rhythm.      Heart sounds: No murmur heard.  Pulmonary:      Breath sounds: Normal breath sounds. No wheezing, rhonchi or rales.   Musculoskeletal:      Right lower leg: No edema.      Left lower leg: No edema.     Right hemiparesis and aphasia.    Medical History as seen below has been reviewed and updated in the chart.  No past medical history on file.  No past surgical history on file.  No family history on file.  Not on File  No current outpatient medications on file prior to visit.     No current facility-administered medications on file prior to visit.       There is no immunization history on file for this patient.  Davie Palacio MD

## 2024-10-10 ENCOUNTER — NURSING HOME VISIT (OUTPATIENT)
Dept: POST ACUTE CARE | Facility: EXTERNAL LOCATION | Age: 89
End: 2024-10-10
Payer: MEDICARE

## 2024-10-10 DIAGNOSIS — J96.11 CHRONIC RESPIRATORY FAILURE WITH HYPOXIA (MULTI): ICD-10-CM

## 2024-10-10 DIAGNOSIS — Z86.73 HISTORY OF STROKE: Primary | ICD-10-CM

## 2024-10-10 DIAGNOSIS — I50.22 CHRONIC SYSTOLIC CONGESTIVE HEART FAILURE: ICD-10-CM

## 2024-10-10 PROCEDURE — 99308 SBSQ NF CARE LOW MDM 20: CPT | Performed by: INTERNAL MEDICINE

## 2024-10-10 NOTE — LETTER
Patient: Leola Ward  : 1928    Encounter Date: 10/10/2024    PROGRESS NOTE      Leola Ward is a 89 y.o. female seen in follow up at [facility].    ASSESSMENT / PLAN:    Chronic systolic congestive heart failure (CMS/HCC)  Chronic respiratory failure with hypoxia (CMS/HCC)  Aphasia  History of stroke  Hypothyroidism, unspecified type    CHF stable on the torsemide.    Hx stroke/aphasia.  On the apixaban for secondary prevention.    DNR-Cc    Subjective  This is a 89 y.o. female who resides at at long term care [facility], seen today for routine follow up.  No new issues reported.  Chart/orders reviewed.    Objective  Vital signs reviewed in [facility].  Physical Exam  Vitals reviewed.   Constitutional:       Appearance: Normal appearance.   Cardiovascular:      Rate and Rhythm: Normal rate and regular rhythm.      Heart sounds: No murmur heard.  Pulmonary:      Breath sounds: Normal breath sounds. No wheezing, rhonchi or rales.   Musculoskeletal:      Right lower leg: No edema.      Left lower leg: No edema.     Right hemiparesis and aphasia.    Medical History as seen below has been reviewed and updated in the chart.  No past medical history on file.  No past surgical history on file.  No family history on file.  Not on File  No current outpatient medications on file prior to visit.     No current facility-administered medications on file prior to visit.       There is no immunization history on file for this patient.  Davie Palacio MD      Electronically Signed By: Davie Palacio MD   10/10/24  9:35 AM

## 2024-10-10 NOTE — PROGRESS NOTES
PROGRESS NOTE      Leola Ward is a 89 y.o. female seen in follow up at [facility].    ASSESSMENT / PLAN:    Chronic systolic congestive heart failure (CMS/HCC)  Chronic respiratory failure with hypoxia (CMS/HCC)  Aphasia  History of stroke  Hypothyroidism, unspecified type    CHF stable on the torsemide.    Hx stroke/aphasia.  On the apixaban for secondary prevention.    DNR-Cc    Subjective   This is a 89 y.o. female who resides at at long term care facility, seen today for routine follow up.  No new issues reported.  Chart/orders reviewed.    Objective   Vital signs reviewed in [facility].  Physical Exam  Vitals reviewed.   Constitutional:       Appearance: Normal appearance.   Cardiovascular:      Rate and Rhythm: Normal rate and regular rhythm.      Heart sounds: No murmur heard.  Pulmonary:      Breath sounds: Normal breath sounds. No wheezing, rhonchi or rales.   Musculoskeletal:      Right lower leg: No edema.      Left lower leg: No edema.     Right hemiparesis and aphasia.    Medical History as seen below has been reviewed and updated in the chart.  No past medical history on file.  No past surgical history on file.  No family history on file.  Not on File  No current outpatient medications on file prior to visit.     No current facility-administered medications on file prior to visit.       There is no immunization history on file for this patient.  Davie Palacio MD

## 2024-10-11 PROCEDURE — 81001 URINALYSIS AUTO W/SCOPE: CPT | Mod: OUT | Performed by: INTERNAL MEDICINE

## 2024-10-11 PROCEDURE — 87086 URINE CULTURE/COLONY COUNT: CPT | Mod: OUT,TRILAB,WESLAB | Performed by: INTERNAL MEDICINE

## 2024-10-12 ENCOUNTER — LAB REQUISITION (OUTPATIENT)
Dept: LAB | Facility: HOSPITAL | Age: 89
End: 2024-10-12
Payer: MEDICARE

## 2024-10-12 DIAGNOSIS — N39.0 URINARY TRACT INFECTION, SITE NOT SPECIFIED: ICD-10-CM

## 2024-10-12 LAB
APPEARANCE UR: ABNORMAL
BACTERIA #/AREA URNS AUTO: ABNORMAL /HPF
BILIRUB UR STRIP.AUTO-MCNC: NEGATIVE MG/DL
COLOR UR: YELLOW
GLUCOSE UR STRIP.AUTO-MCNC: NORMAL MG/DL
KETONES UR STRIP.AUTO-MCNC: NEGATIVE MG/DL
LEUKOCYTE ESTERASE UR QL STRIP.AUTO: ABNORMAL
MUCOUS THREADS #/AREA URNS AUTO: ABNORMAL /LPF
NITRITE UR QL STRIP.AUTO: NEGATIVE
PH UR STRIP.AUTO: 6 [PH]
PROT UR STRIP.AUTO-MCNC: NEGATIVE MG/DL
RBC # UR STRIP.AUTO: ABNORMAL /UL
RBC #/AREA URNS AUTO: ABNORMAL /HPF
SP GR UR STRIP.AUTO: 1.01
SQUAMOUS #/AREA URNS AUTO: ABNORMAL /HPF
UROBILINOGEN UR STRIP.AUTO-MCNC: NORMAL MG/DL
WBC #/AREA URNS AUTO: >50 /HPF
WBC CLUMPS #/AREA URNS AUTO: ABNORMAL /HPF

## 2024-10-14 ENCOUNTER — NURSING HOME VISIT (OUTPATIENT)
Dept: POST ACUTE CARE | Facility: EXTERNAL LOCATION | Age: 89
End: 2024-10-14
Payer: MEDICARE

## 2024-10-14 VITALS
TEMPERATURE: 97.9 F | HEART RATE: 70 BPM | RESPIRATION RATE: 16 BRPM | OXYGEN SATURATION: 93 % | DIASTOLIC BLOOD PRESSURE: 68 MMHG | WEIGHT: 199.2 LBS | SYSTOLIC BLOOD PRESSURE: 142 MMHG

## 2024-10-14 DIAGNOSIS — E03.9 HYPOTHYROIDISM, UNSPECIFIED TYPE: ICD-10-CM

## 2024-10-14 DIAGNOSIS — I50.22 CHRONIC SYSTOLIC CONGESTIVE HEART FAILURE: ICD-10-CM

## 2024-10-14 DIAGNOSIS — J44.9 CHRONIC OBSTRUCTIVE PULMONARY DISEASE, UNSPECIFIED COPD TYPE (MULTI): ICD-10-CM

## 2024-10-14 DIAGNOSIS — N39.0 ACUTE UTI: Primary | ICD-10-CM

## 2024-10-14 DIAGNOSIS — Z86.73 HISTORY OF STROKE: ICD-10-CM

## 2024-10-14 PROCEDURE — 99309 SBSQ NF CARE MODERATE MDM 30: CPT | Performed by: PHYSICIAN ASSISTANT

## 2024-10-14 ASSESSMENT — ENCOUNTER SYMPTOMS
EYE DISCHARGE: 0
APPETITE CHANGE: 0
NERVOUS/ANXIOUS: 0
FREQUENCY: 0
CONFUSION: 0
WHEEZING: 0
FEVER: 0
WEAKNESS: 0
CONSTIPATION: 0
RHINORRHEA: 0
ABDOMINAL PAIN: 0
COUGH: 0
DIZZINESS: 0
TREMORS: 0
NAUSEA: 0
VOMITING: 0
HEADACHES: 0
DIARRHEA: 0
HEMATURIA: 0
CHILLS: 0
DYSURIA: 0
SHORTNESS OF BREATH: 0
EYE REDNESS: 0

## 2024-10-14 NOTE — PROGRESS NOTES
No chief complaint on file.      Subjective   [id number] : Leola Ward is a 89 y.o. female LTC resident at [facility].    HPI   Pt with h/o CVA with right hemiparesis and aphasia, and CHF.  Pt under care of hospice.    Notified by nursing that pt had been having issues with abdominal pain.  She had urine sample collected and it was positive for leukocytes.  Pt started on keflex over the weekend.  Pt seen while resting in bed.  She reports that pain in her abdomen has improved.  Urine culture is still pending.   She is eating and drinking well.  No n/v/d/c.   No shortness of breath or cough.   She is stable on room air.  She is alert and interactive.   Her weight is stable.  Pt is followed by [facility] hospice.    Code status is DNRcc.    Review of Systems   Constitutional:  Negative for appetite change, chills and fever.   HENT:  Negative for congestion, rhinorrhea and sneezing.    Eyes:  Negative for discharge and redness.   Respiratory:  Negative for cough, shortness of breath and wheezing.    Cardiovascular:  Negative for chest pain and leg swelling.   Gastrointestinal:  Negative for abdominal pain, constipation, diarrhea, nausea and vomiting.   Genitourinary:  Negative for dysuria, frequency and hematuria.   Neurological:  Negative for dizziness, tremors, weakness and headaches.   Psychiatric/Behavioral:  Negative for confusion. The patient is not nervous/anxious.    All other systems reviewed and are negative.      Objective   /68   Pulse 70   Temp 36.6 °C (97.9 °F)   Resp 16   Wt 90.4 kg (199 lb 3.2 oz)   SpO2 93%    Physical Exam  Constitutional:       General: She is not in acute distress.     Appearance: She is not ill-appearing.   Eyes:      Pupils: Pupils are equal, round, and reactive to light.   Cardiovascular:      Rate and Rhythm: Normal rate and regular rhythm.      Heart sounds: No murmur heard.  Pulmonary:      Effort: No respiratory distress.      Breath sounds: No wheezing,  "rhonchi or rales.   Abdominal:      General: Bowel sounds are normal. There is no distension.      Palpations: Abdomen is soft. There is no mass.      Tenderness: There is no abdominal tenderness. There is no guarding or rebound.   Musculoskeletal:         General: No swelling. Normal range of motion.   Skin:     General: Skin is warm and dry.      Findings: No rash.   Neurological:      Mental Status: She is alert and oriented to person, place, and time. Mental status is at baseline.      Comments: Right sided hemiparesis and aphasia.        Results for orders placed or performed in visit on 10/11/24 (from the past 96 hour(s))   Urinalysis with Reflex Microscopic   Result Value Ref Range    Color, Urine Yellow Light-Yellow, Yellow, Dark-Yellow    Appearance, Urine Turbid (N) Clear    Specific Gravity, Urine 1.015 1.005 - 1.035    pH, Urine 6.0 5.0, 5.5, 6.0, 6.5, 7.0, 7.5, 8.0    Protein, Urine NEGATIVE NEGATIVE, 10 (TRACE), 20 (TRACE) mg/dL    Glucose, Urine Normal Normal mg/dL    Blood, Urine 0.03 (TRACE) (A) NEGATIVE    Ketones, Urine NEGATIVE NEGATIVE mg/dL    Bilirubin, Urine NEGATIVE NEGATIVE    Urobilinogen, Urine Normal Normal mg/dL    Nitrite, Urine NEGATIVE NEGATIVE    Leukocyte Esterase, Urine 500 Renata/µL (A) NEGATIVE   Urine Culture    Specimen: Clean Catch/Voided; Urine   Result Value Ref Range    Urine Culture >100,000 Enteric bacilli (A)    Microscopic Only, Urine   Result Value Ref Range    WBC, Urine >50 (A) 1-5, NONE /HPF    WBC Clumps, Urine FEW Reference range not established. /HPF    RBC, Urine 3-5 NONE, 1-2, 3-5 /HPF    Squamous Epithelial Cells, Urine 1-9 (SPARSE) Reference range not established. /HPF    Bacteria, Urine 1+ (A) NONE SEEN /HPF    Mucus, Urine FEW Reference range not established. /LPF      No lab exists for component: \"CBC BMP\"  Assessment/Plan         Hypothyroidism  On synthroid.                Chronic systolic congestive heart failure (CMS/HCC) I50.22       Continue torsemide " 20mg.   Under care of [facility] hospice.   Code status DNRcc.         CVA (cerebral vascular accident) (CMS/[facility])  On apixaban.  I63.9     Aphasia R47.01   Chronic respiratory failure - uses oxygen as needed.  Stable on room air.    COPD:  continue budesonide aerosols BID routine. albuterol aerosols prn.    Acute UTI:  started on keflex 500mg BID for 7 days.  Symptoms have improved since starting the antibiotics.  Follow urine culture.      Time spent: 30 min in review of chart, labs and orders, consultation with pt and documentation.

## 2024-10-14 NOTE — LETTER
Patient: Leola Ward  : 1928    Encounter Date: 10/14/2024    No chief complaint on file.      Subjective  [id number] : Leola Ward is a 89 y.o. female LTC resident at [facility].    HPI   Pt with h/o CVA with right hemiparesis and aphasia, and CHF.  Pt under care of hospice.    Notified by nursing that pt had been having issues with abdominal pain.  She had urine sample collected and it was positive for leukocytes.  Pt started on keflex over the weekend.  Pt seen while resting in bed.  She reports that pain in her abdomen has improved.  Urine culture is still pending.   She is eating and drinking well.  No n/v/d/c.   No shortness of breath or cough.   She is stable on room air.  She is alert and interactive.   Her weight is stable.  Pt is followed by [facility] hospice.    Code status is DNRcc.    Review of Systems   Constitutional:  Negative for appetite change, chills and fever.   HENT:  Negative for congestion, rhinorrhea and sneezing.    Eyes:  Negative for discharge and redness.   Respiratory:  Negative for cough, shortness of breath and wheezing.    Cardiovascular:  Negative for chest pain and leg swelling.   Gastrointestinal:  Negative for abdominal pain, constipation, diarrhea, nausea and vomiting.   Genitourinary:  Negative for dysuria, frequency and hematuria.   Neurological:  Negative for dizziness, tremors, weakness and headaches.   Psychiatric/Behavioral:  Negative for confusion. The patient is not nervous/anxious.    All other systems reviewed and are negative.      Objective  /68   Pulse 70   Temp 36.6 °C (97.9 °F)   Resp 16   Wt 90.4 kg (199 lb 3.2 oz)   SpO2 93%    Physical Exam  Constitutional:       General: She is not in acute distress.     Appearance: She is not ill-appearing.   Eyes:      Pupils: Pupils are equal, round, and reactive to light.   Cardiovascular:      Rate and Rhythm: Normal rate and regular rhythm.      Heart sounds: No murmur heard.  Pulmonary:       "Effort: No respiratory distress.      Breath sounds: No wheezing, rhonchi or rales.   Abdominal:      General: Bowel sounds are normal. There is no distension.      Palpations: Abdomen is soft. There is no mass.      Tenderness: There is no abdominal tenderness. There is no guarding or rebound.   Musculoskeletal:         General: No swelling. Normal range of motion.   Skin:     General: Skin is warm and dry.      Findings: No rash.   Neurological:      Mental Status: She is alert and oriented to person, place, and time. Mental status is at baseline.      Comments: Right sided hemiparesis and aphasia.        Results for orders placed or performed in visit on 10/11/24 (from the past 96 hour(s))   Urinalysis with Reflex Microscopic   Result Value Ref Range    Color, Urine Yellow Light-Yellow, Yellow, Dark-Yellow    Appearance, Urine Turbid (N) Clear    Specific Gravity, Urine 1.015 1.005 - 1.035    pH, Urine 6.0 5.0, 5.5, 6.0, 6.5, 7.0, 7.5, 8.0    Protein, Urine NEGATIVE NEGATIVE, 10 (TRACE), 20 (TRACE) mg/dL    Glucose, Urine Normal Normal mg/dL    Blood, Urine 0.03 (TRACE) (A) NEGATIVE    Ketones, Urine NEGATIVE NEGATIVE mg/dL    Bilirubin, Urine NEGATIVE NEGATIVE    Urobilinogen, Urine Normal Normal mg/dL    Nitrite, Urine NEGATIVE NEGATIVE    Leukocyte Esterase, Urine 500 Renata/µL (A) NEGATIVE   Urine Culture    Specimen: Clean Catch/Voided; Urine   Result Value Ref Range    Urine Culture >100,000 Enteric bacilli (A)    Microscopic Only, Urine   Result Value Ref Range    WBC, Urine >50 (A) 1-5, NONE /HPF    WBC Clumps, Urine FEW Reference range not established. /HPF    RBC, Urine 3-5 NONE, 1-2, 3-5 /HPF    Squamous Epithelial Cells, Urine 1-9 (SPARSE) Reference range not established. /HPF    Bacteria, Urine 1+ (A) NONE SEEN /HPF    Mucus, Urine FEW Reference range not established. /LPF      No lab exists for component: \"CBC BMP\"  Assessment/Plan        Hypothyroidism  On synthroid.                Chronic systolic " congestive heart failure (CMS/[facility]) I50.22       Continue torsemide 20mg.   Under care of [facility] hospice.   Code status DNRcc.         CVA (cerebral vascular accident) (CMS/[facility])  On apixaban.  I63.9     Aphasia R47.01   Chronic respiratory failure - uses oxygen as needed.  Stable on room air.    COPD:  continue budesonide aerosols BID routine. albuterol aerosols prn.    Acute UTI:  started on keflex 500mg BID for 7 days.  Symptoms have improved since starting the antibiotics.  Follow urine culture.      Time spent: 30 min in review of chart, labs and orders, consultation with pt and documentation.       Electronically Signed By: Marie Cuevas PA-C   10/14/24  1:11 PM

## 2024-10-15 LAB — BACTERIA UR CULT: ABNORMAL

## 2024-11-18 ENCOUNTER — NURSING HOME VISIT (OUTPATIENT)
Dept: POST ACUTE CARE | Facility: EXTERNAL LOCATION | Age: 89
End: 2024-11-18
Payer: MEDICARE

## 2024-11-18 VITALS
RESPIRATION RATE: 20 BRPM | DIASTOLIC BLOOD PRESSURE: 62 MMHG | WEIGHT: 200 LBS | SYSTOLIC BLOOD PRESSURE: 116 MMHG | OXYGEN SATURATION: 94 % | TEMPERATURE: 97.2 F | HEART RATE: 71 BPM

## 2024-11-18 DIAGNOSIS — I50.22 CHRONIC SYSTOLIC CONGESTIVE HEART FAILURE: ICD-10-CM

## 2024-11-18 DIAGNOSIS — R47.01 APHASIA: ICD-10-CM

## 2024-11-18 DIAGNOSIS — I63.9 CEREBROVASCULAR ACCIDENT (CVA), UNSPECIFIED MECHANISM (MULTI): Primary | ICD-10-CM

## 2024-11-18 DIAGNOSIS — J44.9 CHRONIC OBSTRUCTIVE PULMONARY DISEASE, UNSPECIFIED COPD TYPE (MULTI): ICD-10-CM

## 2024-11-18 DIAGNOSIS — J96.11 CHRONIC RESPIRATORY FAILURE WITH HYPOXIA (MULTI): ICD-10-CM

## 2024-11-18 PROCEDURE — 99308 SBSQ NF CARE LOW MDM 20: CPT | Performed by: PHYSICIAN ASSISTANT

## 2024-11-18 ASSESSMENT — ENCOUNTER SYMPTOMS
ABDOMINAL PAIN: 0
CONSTIPATION: 0
VOMITING: 0
EYE DISCHARGE: 0
COUGH: 0
DYSURIA: 0
FEVER: 0
CHILLS: 0
DIARRHEA: 0
SHORTNESS OF BREATH: 0
EYE REDNESS: 0
HEADACHES: 0
RHINORRHEA: 0
HEMATURIA: 0
TREMORS: 0
NAUSEA: 0
NERVOUS/ANXIOUS: 0
WEAKNESS: 0
CONFUSION: 0
APPETITE CHANGE: 0
DIZZINESS: 0
WHEEZING: 0
FREQUENCY: 0

## 2024-11-18 NOTE — PROGRESS NOTES
No chief complaint on file.      Subjective   [id number] : Leola Ward is a 89 y.o. female LTC resident at [facility].    HPI   Pt with h/o CVA with right hemiparesis and aphasia, and CHF.  Pt under care of hospice.    Asked to see pt by nursing due to concerned that pt was suspected to aspirate over the weekend.  Pt seen while resting in akosua-chair.  She is alert and pleasant.  She appears to be at baseline.  She is stable on room air with no shortness of breath or cough noted.  Lungs are clear.  No signs of aspiration.  Pt is able to answer yes/no questions.  Pt is on thin liquids.   She is eating and drinking well.  No n/v/d/c.   Pt is followed by [facility] hospice.    Code status is DNRcc.    Review of Systems   Constitutional:  Negative for appetite change, chills and fever.   HENT:  Negative for congestion, rhinorrhea and sneezing.    Eyes:  Negative for discharge and redness.   Respiratory:  Negative for cough, shortness of breath and wheezing.    Cardiovascular:  Negative for chest pain and leg swelling.   Gastrointestinal:  Negative for abdominal pain, constipation, diarrhea, nausea and vomiting.   Genitourinary:  Negative for dysuria, frequency and hematuria.   Neurological:  Negative for dizziness, tremors, weakness and headaches.   Psychiatric/Behavioral:  Negative for confusion. The patient is not nervous/anxious.    All other systems reviewed and are negative.      Objective   /62   Pulse 71   Temp 36.2 °C (97.2 °F)   Resp 20   Wt 90.7 kg (200 lb)   SpO2 94%    Physical Exam  Constitutional:       General: She is not in acute distress.     Appearance: She is not ill-appearing.   Eyes:      Pupils: Pupils are equal, round, and reactive to light.   Cardiovascular:      Rate and Rhythm: Normal rate and regular rhythm.      Heart sounds: No murmur heard.  Pulmonary:      Effort: No respiratory distress.      Breath sounds: No wheezing, rhonchi or rales.   Abdominal:      General: Bowel  "sounds are normal. There is no distension.      Palpations: Abdomen is soft. There is no mass.      Tenderness: There is no abdominal tenderness. There is no guarding or rebound.   Musculoskeletal:         General: No swelling. Normal range of motion.   Skin:     General: Skin is warm and dry.      Findings: No rash.   Neurological:      Mental Status: She is alert and oriented to person, place, and time. Mental status is at baseline.      Comments: Right sided hemiparesis and aphasia.        No results found for this or any previous visit (from the past 96 hours).     No lab exists for component: \"CBC BMP\"  Assessment/Plan         Hypothyroidism  On synthroid.                Chronic systolic congestive heart failure (CMS/HCC) I50.22       Continue torsemide 20mg.   Under care of [facility] hospice.   Code status DNRcc.         CVA (cerebral vascular accident) (CMS/[facility])  On apixaban.   No signs of aspiration.  Monitor.  Continue on thin liquids.  I63.9     Aphasia R47.01   Chronic respiratory failure - uses oxygen as needed.  Stable on room air.    COPD:  continue budesonide aerosols BID routine. albuterol aerosols prn.      Time spent: 30 min in review of chart, labs and orders, consultation with pt and documentation.   "

## 2024-11-18 NOTE — LETTER
Patient: Leola Ward  : 1928    Encounter Date: 2024    No chief complaint on file.      Subjective  [id number] : Leola Ward is a 89 y.o. female LTC resident at [facility].    HPI   Pt with h/o CVA with right hemiparesis and aphasia, and CHF.  Pt under care of hospice.    Asked to see pt by nursing due to concerned that pt was suspected to aspirate over the weekend.  Pt seen while resting in akosua-chair.  She is alert and pleasant.  She appears to be at baseline.  She is stable on room air with no shortness of breath or cough noted.  Lungs are clear.  No signs of aspiration.  Pt is able to answer yes/no questions.  Pt is on thin liquids.   She is eating and drinking well.  No n/v/d/c.   Pt is followed by [facility] hospice.    Code status is DNRcc.    Review of Systems   Constitutional:  Negative for appetite change, chills and fever.   HENT:  Negative for congestion, rhinorrhea and sneezing.    Eyes:  Negative for discharge and redness.   Respiratory:  Negative for cough, shortness of breath and wheezing.    Cardiovascular:  Negative for chest pain and leg swelling.   Gastrointestinal:  Negative for abdominal pain, constipation, diarrhea, nausea and vomiting.   Genitourinary:  Negative for dysuria, frequency and hematuria.   Neurological:  Negative for dizziness, tremors, weakness and headaches.   Psychiatric/Behavioral:  Negative for confusion. The patient is not nervous/anxious.    All other systems reviewed and are negative.      Objective  /62   Pulse 71   Temp 36.2 °C (97.2 °F)   Resp 20   Wt 90.7 kg (200 lb)   SpO2 94%    Physical Exam  Constitutional:       General: She is not in acute distress.     Appearance: She is not ill-appearing.   Eyes:      Pupils: Pupils are equal, round, and reactive to light.   Cardiovascular:      Rate and Rhythm: Normal rate and regular rhythm.      Heart sounds: No murmur heard.  Pulmonary:      Effort: No respiratory distress.      Breath sounds:  "No wheezing, rhonchi or rales.   Abdominal:      General: Bowel sounds are normal. There is no distension.      Palpations: Abdomen is soft. There is no mass.      Tenderness: There is no abdominal tenderness. There is no guarding or rebound.   Musculoskeletal:         General: No swelling. Normal range of motion.   Skin:     General: Skin is warm and dry.      Findings: No rash.   Neurological:      Mental Status: She is alert and oriented to person, place, and time. Mental status is at baseline.      Comments: Right sided hemiparesis and aphasia.        No results found for this or any previous visit (from the past 96 hours).     No lab exists for component: \"CBC BMP\"  Assessment/Plan        Hypothyroidism  On synthroid.                Chronic systolic congestive heart failure (CMS/HCC) I50.22       Continue torsemide 20mg.   Under care of [facility] hospice.   Code status DNRcc.         CVA (cerebral vascular accident) (CMS/HCC)  On apixaban.   No signs of aspiration.  Monitor.  Continue on thin liquids.  I63.9     Aphasia R47.01   Chronic respiratory failure - uses oxygen as needed.  Stable on room air.    COPD:  continue budesonide aerosols BID routine. albuterol aerosols prn.      Time spent: 30 min in review of chart, labs and orders, consultation with pt and documentation.       Electronically Signed By: Marie Cuevas PA-C   11/18/24  3:49 PM  "

## 2024-12-12 ENCOUNTER — NURSING HOME VISIT (OUTPATIENT)
Dept: POST ACUTE CARE | Facility: EXTERNAL LOCATION | Age: 89
End: 2024-12-12
Payer: MEDICARE

## 2024-12-12 DIAGNOSIS — J44.9 CHRONIC OBSTRUCTIVE PULMONARY DISEASE, UNSPECIFIED COPD TYPE (MULTI): ICD-10-CM

## 2024-12-12 DIAGNOSIS — J96.11 CHRONIC RESPIRATORY FAILURE WITH HYPOXIA (MULTI): ICD-10-CM

## 2024-12-12 DIAGNOSIS — I50.22 CHRONIC SYSTOLIC CONGESTIVE HEART FAILURE: Primary | ICD-10-CM

## 2024-12-12 NOTE — LETTER
Patient: Leola Ward  : 1928    Encounter Date: 2024    PROGRESS NOTE      Leola Ward is a 89 y.o. female seen in follow up at [facility].    ASSESSMENT / PLAN:    Chronic systolic congestive heart failure (CMS/HCC)  Chronic respiratory failure with hypoxia (CMS/HCC)  Aphasia  History of stroke  Hypothyroidism, unspecified type    CHF stable on the torsemide.  Has been stable for some time.  Weight range 195-202 until this month now 192, monitoring    Hx stroke/aphasia.  On the apixaban for secondary prevention.    DNR-CC    Subjective  This is a 89 y.o. female who resides at at long term care facility, seen today for routine follow up.  The nursing facility documentation is reviewed including orders.  There are currently no concerns over adverse reactions to the prescribed medications.  I reviewed any pertinent laboratory studies.  I discussed the patient's condition with the staff, including current functional status.     Objective  Vital signs reviewed in [facility].  Physical Exam  Vitals reviewed.   Constitutional:       Appearance: Normal appearance.   Cardiovascular:      Rate and Rhythm: Normal rate and regular rhythm.      Heart sounds: No murmur heard.  Pulmonary:      Breath sounds: Normal breath sounds. No wheezing, rhonchi or rales.   Musculoskeletal:      Right lower leg: No edema.      Left lower leg: No edema.     Right hemiparesis and aphasia.    Medical History as seen below has been reviewed and updated in the chart.  No past medical history on file.  No past surgical history on file.  No family history on file.  Not on File  No current outpatient medications on file prior to visit.     No current facility-administered medications on file prior to visit.       There is no immunization history on file for this patient.  Davie Palacio MD      Electronically Signed By: Davie Palacio MD   24  9:09 AM

## 2025-01-09 ENCOUNTER — NURSING HOME VISIT (OUTPATIENT)
Dept: POST ACUTE CARE | Facility: EXTERNAL LOCATION | Age: OVER 89
End: 2025-01-09
Payer: MEDICARE

## 2025-01-09 DIAGNOSIS — I50.22 CHRONIC SYSTOLIC CONGESTIVE HEART FAILURE: Primary | ICD-10-CM

## 2025-01-09 DIAGNOSIS — I63.9 CEREBROVASCULAR ACCIDENT (CVA), UNSPECIFIED MECHANISM (MULTI): ICD-10-CM

## 2025-01-09 DIAGNOSIS — J44.9 CHRONIC OBSTRUCTIVE PULMONARY DISEASE, UNSPECIFIED COPD TYPE (MULTI): ICD-10-CM

## 2025-01-09 PROCEDURE — 99308 SBSQ NF CARE LOW MDM 20: CPT | Performed by: INTERNAL MEDICINE

## 2025-01-09 NOTE — LETTER
Patient: Leola Ward  : 1928    Encounter Date: 2025    PROGRESS NOTE      Leola Ward is a 89 y.o. female seen in follow up at [facility]    ASSESSMENT / PLAN:    Chronic systolic congestive heart failure (CMS/HCC)  Chronic respiratory failure with hypoxia (CMS/HCC)  Aphasia  History of stroke  Hypothyroidism, unspecified type    CHF stable on the torsemide.  Has been stable for some time. Reviewed - [phone] - likely within margin of error given otherwise clinically euvolimic    Hx stroke/aphasia.  On the apixaban for secondary prevention.    DNR-CC    Subjective  This is a 89 y.o. female who resides at at long term care [facility], seen today for routine follow up.  No new issues per staff, no new complaints per patient    Objective  Vital signs reviewed in [facility].  Physical Exam  Vitals reviewed.   Constitutional:       Appearance: Normal appearance.   Cardiovascular:      Rate and Rhythm: Normal rate and regular rhythm.      Heart sounds: No murmur heard.  Pulmonary:      Breath sounds: Normal breath sounds. No wheezing, rhonchi or rales.   Musculoskeletal:      Right lower leg: No edema.      Left lower leg: No edema.     Right hemiparesis and aphasia.    Medical History as seen below has been reviewed and updated in the chart.  No past medical history on file.  No past surgical history on file.  No family history on file.  Not on File  No current outpatient medications on file prior to visit.     No current facility-administered medications on file prior to visit.       There is no immunization history on file for this patient.  Davie Palacio MD      Electronically Signed By: Davie Palacio MD   25  1:05 PM

## 2025-01-12 NOTE — PROGRESS NOTES
PROGRESS NOTE      Leola Ward is a 89 y.o. female seen in follow up at [facility].    ASSESSMENT / PLAN:    Chronic systolic congestive heart failure (CMS/HCC)  Chronic respiratory failure with hypoxia (CMS/HCC)  Aphasia  History of stroke  Hypothyroidism, unspecified type    CHF stable on the torsemide.  Has been stable for some time. Reviewed - [phone] - likely within margin of error given otherwise clinically euvolimic    Hx stroke/aphasia.  On the apixaban for secondary prevention.    DNR-CC    Subjective   This is a 89 y.o. female who resides at at long term care [facility], seen today for routine follow up.  No new issues per staff, no new complaints per patient    Objective   Vital signs reviewed in [facility].  Physical Exam  Vitals reviewed.   Constitutional:       Appearance: Normal appearance.   Cardiovascular:      Rate and Rhythm: Normal rate and regular rhythm.      Heart sounds: No murmur heard.  Pulmonary:      Breath sounds: Normal breath sounds. No wheezing, rhonchi or rales.   Musculoskeletal:      Right lower leg: No edema.      Left lower leg: No edema.     Right hemiparesis and aphasia.    Medical History as seen below has been reviewed and updated in the chart.  No past medical history on file.  No past surgical history on file.  No family history on file.  Not on File  No current outpatient medications on file prior to visit.     No current facility-administered medications on file prior to visit.       There is no immunization history on file for this patient.  Davie Palacio MD

## 2025-02-13 ENCOUNTER — NURSING HOME VISIT (OUTPATIENT)
Dept: POST ACUTE CARE | Facility: EXTERNAL LOCATION | Age: OVER 89
End: 2025-02-13
Payer: MEDICARE

## 2025-02-13 DIAGNOSIS — I50.22 CHRONIC SYSTOLIC CONGESTIVE HEART FAILURE: Primary | ICD-10-CM

## 2025-02-13 DIAGNOSIS — J44.9 CHRONIC OBSTRUCTIVE PULMONARY DISEASE, UNSPECIFIED COPD TYPE (MULTI): ICD-10-CM

## 2025-02-13 DIAGNOSIS — I63.9 CEREBROVASCULAR ACCIDENT (CVA), UNSPECIFIED MECHANISM (MULTI): ICD-10-CM

## 2025-02-13 PROCEDURE — 99307 SBSQ NF CARE SF MDM 10: CPT | Performed by: INTERNAL MEDICINE

## 2025-02-13 NOTE — PROGRESS NOTES
PROGRESS NOTE      Leola Ward is a 89 y.o. female seen in follow up at [facility].    ASSESSMENT / PLAN:    Chronic systolic congestive heart failure (CMS/HCC)  Chronic respiratory failure with hypoxia (CMS/HCC)  Aphasia  History of stroke  Hypothyroidism, unspecified type    CHF stable on the torsemide.  Remains stable.    Hx stroke/aphasia.  On the apixaban for secondary prevention.    DNR-CC    Subjective   This is a 89 y.o. female who resides at at long term care facility, seen today for routine follow up.  No shortness of breath, orthopnea or PND.  Reviewed the weights and she typically is around 200 pounds, plus or minus a few pounds and this has been consistent for quite some time    Objective   Vital signs reviewed in [facility].  Physical Exam  Vitals reviewed.   Constitutional:       Appearance: Normal appearance.   Cardiovascular:      Rate and Rhythm: Normal rate and regular rhythm.      Heart sounds: No murmur heard.  Pulmonary:      Breath sounds: Normal breath sounds. No wheezing, rhonchi or rales.   Musculoskeletal:      Right lower leg: No edema.      Left lower leg: No edema.     Right hemiparesis and aphasia.    Medical History as seen below has been reviewed and updated in the chart.  No past medical history on file.  No past surgical history on file.  No family history on file.  Not on File  No current outpatient medications on file prior to visit.     No current facility-administered medications on file prior to visit.       There is no immunization history on file for this patient.  Davie Palacio MD

## 2025-02-13 NOTE — LETTER
Patient: Leola Ward  : 1928    Encounter Date: 2025    PROGRESS NOTE      Leola Ward is a 89 y.o. female seen in follow up at [facility].    ASSESSMENT / PLAN:    Chronic systolic congestive heart failure (CMS/HCC)  Chronic respiratory failure with hypoxia (CMS/HCC)  Aphasia  History of stroke  Hypothyroidism, unspecified type    CHF stable on the torsemide.  Remains stable.    Hx stroke/aphasia.  On the apixaban for secondary prevention.    DNR-CC    Subjective  This is a 89 y.o. female who resides at at long term care facility, seen today for routine follow up.  No shortness of breath, orthopnea or PND.  Reviewed the weights and she typically is around 200 pounds, plus or minus a few pounds and this has been consistent for quite some time    Objective  Vital signs reviewed in [facility].  Physical Exam  Vitals reviewed.   Constitutional:       Appearance: Normal appearance.   Cardiovascular:      Rate and Rhythm: Normal rate and regular rhythm.      Heart sounds: No murmur heard.  Pulmonary:      Breath sounds: Normal breath sounds. No wheezing, rhonchi or rales.   Musculoskeletal:      Right lower leg: No edema.      Left lower leg: No edema.     Right hemiparesis and aphasia.    Medical History as seen below has been reviewed and updated in the chart.  No past medical history on file.  No past surgical history on file.  No family history on file.  Not on File  No current outpatient medications on file prior to visit.     No current facility-administered medications on file prior to visit.       There is no immunization history on file for this patient.  Davie Palacio MD      Electronically Signed By: Davie Palacio MD   25  4:27 PM

## 2025-04-04 ENCOUNTER — NURSING HOME VISIT (OUTPATIENT)
Dept: POST ACUTE CARE | Facility: EXTERNAL LOCATION | Age: OVER 89
End: 2025-04-04
Payer: MEDICARE

## 2025-04-04 VITALS
HEART RATE: 75 BPM | OXYGEN SATURATION: 96 % | WEIGHT: 206.4 LBS | TEMPERATURE: 97.9 F | DIASTOLIC BLOOD PRESSURE: 62 MMHG | SYSTOLIC BLOOD PRESSURE: 134 MMHG | RESPIRATION RATE: 18 BRPM

## 2025-04-04 DIAGNOSIS — Z86.73 HISTORY OF STROKE: ICD-10-CM

## 2025-04-04 DIAGNOSIS — I63.9 CEREBROVASCULAR ACCIDENT (CVA), UNSPECIFIED MECHANISM (MULTI): ICD-10-CM

## 2025-04-04 DIAGNOSIS — E03.9 HYPOTHYROIDISM, UNSPECIFIED TYPE: ICD-10-CM

## 2025-04-04 DIAGNOSIS — J96.11 CHRONIC RESPIRATORY FAILURE WITH HYPOXIA: ICD-10-CM

## 2025-04-04 DIAGNOSIS — R47.01 APHASIA: ICD-10-CM

## 2025-04-04 DIAGNOSIS — I50.22 CHRONIC SYSTOLIC CONGESTIVE HEART FAILURE: Primary | ICD-10-CM

## 2025-04-04 DIAGNOSIS — J44.9 CHRONIC OBSTRUCTIVE PULMONARY DISEASE, UNSPECIFIED COPD TYPE (MULTI): ICD-10-CM

## 2025-04-04 PROCEDURE — 99308 SBSQ NF CARE LOW MDM 20: CPT | Performed by: PHYSICIAN ASSISTANT

## 2025-04-04 ASSESSMENT — ENCOUNTER SYMPTOMS
CONFUSION: 0
CONSTIPATION: 0
HEMATURIA: 0
VOMITING: 0
WEAKNESS: 0
EYE DISCHARGE: 0
DIARRHEA: 0
HEADACHES: 0
NERVOUS/ANXIOUS: 0
TREMORS: 0
DIZZINESS: 0
WHEEZING: 0
FEVER: 0
COUGH: 0
FREQUENCY: 0
ABDOMINAL PAIN: 0
RHINORRHEA: 0
APPETITE CHANGE: 0
EYE REDNESS: 0
CHILLS: 0
NAUSEA: 0
DYSURIA: 0
SHORTNESS OF BREATH: 0

## 2025-04-04 NOTE — LETTER
Patient: Leola Ward  : 1928    Encounter Date: 2025    No chief complaint on file.      Subjective  [id number] : Leola Ward is a 89 y.o. female LTC resident at [facility].    HPI   Pt with h/o CVA with right hemiparesis and aphasia, and CHF.  Pt under care of hospice.    Pt seen while resting in bed.  She is alert and pleasant.   No new issues reported by nursing.  She has been doing well.  She appears to be at baseline.  She is stable on room air with no shortness of breath or cough noted.  Lungs are clear.  Pt is able to answer yes/no questions.  She is eating and drinking well.  No n/v/d/c.   Pt is followed by [facility].    Code status is DNRcc.    Review of Systems   Constitutional:  Negative for appetite change, chills and fever.   HENT:  Negative for congestion, rhinorrhea and sneezing.    Eyes:  Negative for discharge and redness.   Respiratory:  Negative for cough, shortness of breath and wheezing.    Cardiovascular:  Negative for chest pain and leg swelling.   Gastrointestinal:  Negative for abdominal pain, constipation, diarrhea, nausea and vomiting.   Genitourinary:  Negative for dysuria, frequency and hematuria.   Neurological:  Negative for dizziness, tremors, weakness and headaches.   Psychiatric/Behavioral:  Negative for confusion. The patient is not nervous/anxious.    All other systems reviewed and are negative.      Objective  /62   Pulse 75   Temp 36.6 °C (97.9 °F)   Resp 18   Wt 93.6 kg (206 lb 6.4 oz)   SpO2 96%    Physical Exam  Constitutional:       General: She is not in acute distress.     Appearance: She is not ill-appearing.   Eyes:      Pupils: Pupils are equal, round, and reactive to light.   Cardiovascular:      Rate and Rhythm: Normal rate and regular rhythm.      Heart sounds: No murmur heard.  Pulmonary:      Effort: No respiratory distress.      Breath sounds: No wheezing, rhonchi or rales.   Abdominal:      General: Bowel sounds are normal.  "There is no distension.      Palpations: Abdomen is soft. There is no mass.      Tenderness: There is no abdominal tenderness. There is no guarding or rebound.   Musculoskeletal:         General: No swelling. Normal range of motion.   Skin:     General: Skin is warm and dry.      Findings: No rash.   Neurological:      Mental Status: She is alert and oriented to person, place, and time. Mental status is at baseline.      Comments: Right sided hemiparesis and aphasia.        No results found for this or any previous visit (from the past 96 hours).     No lab exists for component: \"CBC BMP\"  Assessment/Plan        Hypothyroidism  On synthroid.                Chronic systolic congestive heart failure (CMS/[facility]) I50.22       Continue torsemide 20mg.   Under care of [facility] hospice.   Code status DNRcc.         CVA (cerebral vascular accident) (CMS/[facility])  On apixaban.    I63.9     Aphasia R47.01   Chronic respiratory failure - uses oxygen as needed.  Stable on room air.    COPD:  continue budesonide aerosols BID routine. albuterol aerosols prn.      Time spent: 30 min in review of chart, labs and orders, consultation with pt and documentation.       Electronically Signed By: Marie Cuevas PA-C   4/4/25 12:20 PM  "

## 2025-04-04 NOTE — PROGRESS NOTES
No chief complaint on file.      Subjective   [id number] : Leola Ward is a 89 y.o. female LTC resident at [facility].    HPI   Pt with h/o CVA with right hemiparesis and aphasia, and CHF.  Pt under care of hospice.    Pt seen while resting in bed.  She is alert and pleasant.   No new issues reported by nursing.  She has been doing well.  She appears to be at baseline.  She is stable on room air with no shortness of breath or cough noted.  Lungs are clear.  Pt is able to answer yes/no questions.  She is eating and drinking well.  No n/v/d/c.   Pt is followed by [facility].    Code status is DNRcc.    Review of Systems   Constitutional:  Negative for appetite change, chills and fever.   HENT:  Negative for congestion, rhinorrhea and sneezing.    Eyes:  Negative for discharge and redness.   Respiratory:  Negative for cough, shortness of breath and wheezing.    Cardiovascular:  Negative for chest pain and leg swelling.   Gastrointestinal:  Negative for abdominal pain, constipation, diarrhea, nausea and vomiting.   Genitourinary:  Negative for dysuria, frequency and hematuria.   Neurological:  Negative for dizziness, tremors, weakness and headaches.   Psychiatric/Behavioral:  Negative for confusion. The patient is not nervous/anxious.    All other systems reviewed and are negative.      Objective   /62   Pulse 75   Temp 36.6 °C (97.9 °F)   Resp 18   Wt 93.6 kg (206 lb 6.4 oz)   SpO2 96%    Physical Exam  Constitutional:       General: She is not in acute distress.     Appearance: She is not ill-appearing.   Eyes:      Pupils: Pupils are equal, round, and reactive to light.   Cardiovascular:      Rate and Rhythm: Normal rate and regular rhythm.      Heart sounds: No murmur heard.  Pulmonary:      Effort: No respiratory distress.      Breath sounds: No wheezing, rhonchi or rales.   Abdominal:      General: Bowel sounds are normal. There is no distension.      Palpations: Abdomen is soft. There is no  "mass.      Tenderness: There is no abdominal tenderness. There is no guarding or rebound.   Musculoskeletal:         General: No swelling. Normal range of motion.   Skin:     General: Skin is warm and dry.      Findings: No rash.   Neurological:      Mental Status: She is alert and oriented to person, place, and time. Mental status is at baseline.      Comments: Right sided hemiparesis and aphasia.        No results found for this or any previous visit (from the past 96 hours).     No lab exists for component: \"CBC BMP\"  Assessment/Plan         Hypothyroidism  On synthroid.                Chronic systolic congestive heart failure (CMS/HCC) I50.22       Continue torsemide 20mg.   Under care of [facility] hospice.   Code status DNRcc.         CVA (cerebral vascular accident) (CMS/[facility])  On apixaban.    I63.9     Aphasia R47.01   Chronic respiratory failure - uses oxygen as needed.  Stable on room air.    COPD:  continue budesonide aerosols BID routine. albuterol aerosols prn.      Time spent: 30 min in review of chart, labs and orders, consultation with pt and documentation.   "

## 2025-04-10 ENCOUNTER — NURSING HOME VISIT (OUTPATIENT)
Dept: POST ACUTE CARE | Facility: EXTERNAL LOCATION | Age: OVER 89
End: 2025-04-10
Payer: MEDICARE

## 2025-04-10 DIAGNOSIS — I50.22 CHRONIC SYSTOLIC CONGESTIVE HEART FAILURE: Primary | ICD-10-CM

## 2025-04-10 DIAGNOSIS — I63.9 CEREBROVASCULAR ACCIDENT (CVA), UNSPECIFIED MECHANISM (MULTI): ICD-10-CM

## 2025-04-10 DIAGNOSIS — J44.9 CHRONIC OBSTRUCTIVE PULMONARY DISEASE, UNSPECIFIED COPD TYPE (MULTI): ICD-10-CM

## 2025-04-10 PROCEDURE — 99308 SBSQ NF CARE LOW MDM 20: CPT | Performed by: INTERNAL MEDICINE

## 2025-04-10 NOTE — PROGRESS NOTES
PROGRESS NOTE      Leola Ward is a 89 y.o. female seen in follow up at [facility].    ASSESSMENT / PLAN:    Chronic systolic congestive heart failure (CMS/HCC)  Chronic respiratory failure with hypoxia (CMS/HCC)  Aphasia  History of stroke  Hypothyroidism, unspecified type    CHF, stable on the torsemide.  Remains stable.    Hx stroke/aphasia.  On the apixaban for secondary prevention.    DNR-CC.  Continue hospice care.      Subjective   This is a 89 y.o. female who resides at at long term care facility, seen today for routine follow up.  She has history of heart failure.  She is on hospice care.  She remains on room air.  Appetite seems to be fairly good.  CODE STATUS is DNR CC.    Objective   Vital signs reviewed in [facility].  Physical Exam  Vitals reviewed.   Constitutional:       Appearance: Normal appearance.   Cardiovascular:      Rate and Rhythm: Normal rate and regular rhythm.      Heart sounds: No murmur heard.  Pulmonary:      Breath sounds: Normal breath sounds. No wheezing, rhonchi or rales.   Musculoskeletal:      Right lower leg: No edema.      Left lower leg: No edema.     Right hemiparesis and aphasia.    Medical History as seen below has been reviewed and updated in the chart.  No past medical history on file.  No past surgical history on file.  No family history on file.  Not on File  No current outpatient medications on file prior to visit.     No current facility-administered medications on file prior to visit.       There is no immunization history on file for this patient.  Davie Palacio MD

## 2025-04-10 NOTE — LETTER
Patient: Leola Ward  : 1928    Encounter Date: 04/10/2025    PROGRESS NOTE      Leola Ward is a 89 y.o. female seen in follow up at [facility].    ASSESSMENT / PLAN:    Chronic systolic congestive heart failure (CMS/HCC)  Chronic respiratory failure with hypoxia (CMS/HCC)  Aphasia  History of stroke  Hypothyroidism, unspecified type    CHF, stable on the torsemide.  Remains stable.    Hx stroke/aphasia.  On the apixaban for secondary prevention.    DNR-CC.  Continue hospice care.      Subjective  This is a 89 y.o. female who resides at at long term care facility, seen today for routine follow up.  She has history of heart failure.  She is on hospice care.  She remains on room air.  Appetite seems to be fairly good.  CODE STATUS is DNR CC.    Objective  Vital signs reviewed in [facility].  Physical Exam  Vitals reviewed.   Constitutional:       Appearance: Normal appearance.   Cardiovascular:      Rate and Rhythm: Normal rate and regular rhythm.      Heart sounds: No murmur heard.  Pulmonary:      Breath sounds: Normal breath sounds. No wheezing, rhonchi or rales.   Musculoskeletal:      Right lower leg: No edema.      Left lower leg: No edema.     Right hemiparesis and aphasia.    Medical History as seen below has been reviewed and updated in the chart.  No past medical history on file.  No past surgical history on file.  No family history on file.  Not on File  No current outpatient medications on file prior to visit.     No current facility-administered medications on file prior to visit.       There is no immunization history on file for this patient.  Davie Palacio MD      Electronically Signed By: Davie Palacio MD   4/10/25  1:18 PM

## 2025-05-08 ENCOUNTER — NURSING HOME VISIT (OUTPATIENT)
Dept: POST ACUTE CARE | Facility: EXTERNAL LOCATION | Age: OVER 89
End: 2025-05-08
Payer: MEDICARE

## 2025-05-08 DIAGNOSIS — I50.22 CHRONIC SYSTOLIC CONGESTIVE HEART FAILURE: Primary | ICD-10-CM

## 2025-05-08 DIAGNOSIS — I63.9 CEREBROVASCULAR ACCIDENT (CVA), UNSPECIFIED MECHANISM (MULTI): ICD-10-CM

## 2025-05-08 DIAGNOSIS — J96.11 CHRONIC RESPIRATORY FAILURE WITH HYPOXIA: ICD-10-CM

## 2025-05-08 DIAGNOSIS — J44.9 CHRONIC OBSTRUCTIVE PULMONARY DISEASE, UNSPECIFIED COPD TYPE (MULTI): ICD-10-CM

## 2025-05-08 PROCEDURE — 99308 SBSQ NF CARE LOW MDM 20: CPT | Performed by: INTERNAL MEDICINE

## 2025-05-08 NOTE — LETTER
Patient: Leola Ward  : 1928    Encounter Date: 2025    PROGRESS NOTE      Leola Ward is a 89 y.o. female seen in follow up at [facility].    ASSESSMENT / PLAN:    Chronic systolic congestive heart failure (CMS/HCC)  Chronic respiratory failure with hypoxia (CMS/HCC)  Aphasia  History of stroke  Hypothyroidism, unspecified type    CHF, stable on the torsemide.  Remains euvolemic clinically.    Hx stroke/aphasia.  On the apixaban for secondary prevention.    DNR-CC.  Continue hospice care.    Subjective  This is a 89 y.o. female who resides at at long term care [facility], seen today for routine follow up.  She has history of heart failure.  No new issues and overall has been clinically stable.    Objective  Vital signs reviewed in [facility].  Physical Exam  Vitals reviewed.   Constitutional:       Appearance: Normal appearance.   Cardiovascular:      Rate and Rhythm: Normal rate and regular rhythm.      Heart sounds: No murmur heard.  Pulmonary:      Breath sounds: Normal breath sounds. No wheezing, rhonchi or rales.   Musculoskeletal:      Right lower leg: No edema.      Left lower leg: No edema.     Right hemiparesis and aphasia.    Medical History as seen below has been reviewed and updated in the chart.  No past medical history on file.  No past surgical history on file.  No family history on file.  Not on File  No current outpatient medications on file prior to visit.     No current facility-administered medications on file prior to visit.       There is no immunization history on file for this patient.  Davie Palacio MD    Electronically Signed By: Davie Palacio MD   25 11:55 AM

## 2025-05-08 NOTE — PROGRESS NOTES
PROGRESS NOTE      Leola Ward is a 89 y.o. female seen in follow up at [facility].    ASSESSMENT / PLAN:    Chronic systolic congestive heart failure (CMS/HCC)  Chronic respiratory failure with hypoxia (CMS/HCC)  Aphasia  History of stroke  Hypothyroidism, unspecified type    CHF, stable on the torsemide.  Remains euvolemic clinically.    Hx stroke/aphasia.  On the apixaban for secondary prevention.    DNR-CC.  Continue hospice care.    Subjective   This is a 89 y.o. female who resides at at long term care facility, seen today for routine follow up.  She has history of heart failure.  No new issues and overall has been clinically stable.    Objective   Vital signs reviewed in [facility].  Physical Exam  Vitals reviewed.   Constitutional:       Appearance: Normal appearance.   Cardiovascular:      Rate and Rhythm: Normal rate and regular rhythm.      Heart sounds: No murmur heard.  Pulmonary:      Breath sounds: Normal breath sounds. No wheezing, rhonchi or rales.   Musculoskeletal:      Right lower leg: No edema.      Left lower leg: No edema.     Right hemiparesis and aphasia.    Medical History as seen below has been reviewed and updated in the chart.  No past medical history on file.  No past surgical history on file.  No family history on file.  Not on File  No current outpatient medications on file prior to visit.     No current facility-administered medications on file prior to visit.       There is no immunization history on file for this patient.  Davie Palacio MD

## 2025-06-12 ENCOUNTER — NURSING HOME VISIT (OUTPATIENT)
Dept: POST ACUTE CARE | Facility: EXTERNAL LOCATION | Age: OVER 89
End: 2025-06-12
Payer: MEDICARE

## 2025-06-12 DIAGNOSIS — J44.9 CHRONIC OBSTRUCTIVE PULMONARY DISEASE, UNSPECIFIED COPD TYPE (MULTI): ICD-10-CM

## 2025-06-12 DIAGNOSIS — I50.22 CHRONIC SYSTOLIC CONGESTIVE HEART FAILURE: Primary | ICD-10-CM

## 2025-06-12 PROCEDURE — 99307 SBSQ NF CARE SF MDM 10: CPT | Performed by: INTERNAL MEDICINE

## 2025-06-12 NOTE — PROGRESS NOTES
PROGRESS NOTE      Leola Ward is a 89 y.o. female seen in follow up at [facility].    ASSESSMENT / PLAN:    Chronic systolic congestive heart failure (CMS/HCC)  Chronic respiratory failure with hypoxia (CMS/HCC)  Aphasia  History of stroke  Hypothyroidism, unspecified type    CHF, stable on the torsemide.  Remains euvolemic clinically.    Hx stroke/aphasia.  On the apixaban for secondary prevention.    DNR-CC.  Continue hospice care.    Subjective   This is a 89 y.o. female who resides at at long term care facility.  No new issues identified.  No CP/SOB.  NO issues per staff.    Objective   Vital signs reviewed in [facility].  Physical Exam  Vitals reviewed.   Constitutional:       Appearance: Normal appearance.   Cardiovascular:      Rate and Rhythm: Normal rate and regular rhythm.      Heart sounds: No murmur heard.  Pulmonary:      Breath sounds: Normal breath sounds. No wheezing, rhonchi or rales.   Musculoskeletal:      Right lower leg: No edema.      Left lower leg: No edema.     Right hemiparesis and aphasia.    Medical History as seen below has been reviewed and updated in the chart.  No past medical history on file.  No past surgical history on file.  No family history on file.  Not on File  No current outpatient medications on file prior to visit.     No current facility-administered medications on file prior to visit.       There is no immunization history on file for this patient.  Davie Palacio MD

## 2025-06-12 NOTE — LETTER
Patient: Leola Ward  : 1928    Encounter Date: 2025    PROGRESS NOTE      Leola Ward is a 89 y.o. female seen in follow up at [facility].    ASSESSMENT / PLAN:    Chronic systolic congestive heart failure (CMS/HCC)  Chronic respiratory failure with hypoxia (CMS/HCC)  Aphasia  History of stroke  Hypothyroidism, unspecified type    CHF, stable on the torsemide.  Remains euvolemic clinically.    Hx stroke/aphasia.  On the apixaban for secondary prevention.    DNR-CC.  Continue hospice care.    Subjective  This is a 89 y.o. female who resides at at long term care facility.  No new issues identified.  No CP/SOB.  NO issues per staff.    Objective  Vital signs reviewed in [facility].  Physical Exam  Vitals reviewed.   Constitutional:       Appearance: Normal appearance.   Cardiovascular:      Rate and Rhythm: Normal rate and regular rhythm.      Heart sounds: No murmur heard.  Pulmonary:      Breath sounds: Normal breath sounds. No wheezing, rhonchi or rales.   Musculoskeletal:      Right lower leg: No edema.      Left lower leg: No edema.     Right hemiparesis and aphasia.    Medical History as seen below has been reviewed and updated in the chart.  No past medical history on file.  No past surgical history on file.  No family history on file.  Not on File  No current outpatient medications on file prior to visit.     No current facility-administered medications on file prior to visit.       There is no immunization history on file for this patient.  Davie Palacio MD    Electronically Signed By: Davie Palacio MD   25  9:16 AM

## 2025-06-13 ENCOUNTER — NURSING HOME VISIT (OUTPATIENT)
Dept: POST ACUTE CARE | Facility: EXTERNAL LOCATION | Age: OVER 89
End: 2025-06-13
Payer: MEDICARE

## 2025-06-13 ENCOUNTER — TELEPHONE (OUTPATIENT)
Dept: PRIMARY CARE | Facility: CLINIC | Age: OVER 89
End: 2025-06-13
Payer: MEDICARE

## 2025-06-13 DIAGNOSIS — J31.0 RHINITIS, UNSPECIFIED TYPE: ICD-10-CM

## 2025-06-13 DIAGNOSIS — R14.3 FLATULENCE: ICD-10-CM

## 2025-06-13 DIAGNOSIS — I63.9 CEREBROVASCULAR ACCIDENT (CVA), UNSPECIFIED MECHANISM (MULTI): ICD-10-CM

## 2025-06-13 DIAGNOSIS — R11.0 NAUSEA: ICD-10-CM

## 2025-06-13 DIAGNOSIS — I10 ESSENTIAL HYPERTENSION: ICD-10-CM

## 2025-06-13 DIAGNOSIS — E03.9 HYPOTHYROIDISM, UNSPECIFIED TYPE: ICD-10-CM

## 2025-06-13 DIAGNOSIS — M13.0 POLYARTHRITIS: ICD-10-CM

## 2025-06-13 DIAGNOSIS — K59.00 CONSTIPATION, UNSPECIFIED CONSTIPATION TYPE: Primary | ICD-10-CM

## 2025-06-13 DIAGNOSIS — J98.01 BRONCHOSPASM: ICD-10-CM

## 2025-06-13 PROCEDURE — 99310 SBSQ NF CARE HIGH MDM 45: CPT | Performed by: NURSE PRACTITIONER

## 2025-06-13 NOTE — TELEPHONE ENCOUNTER
My apologies. I could have sworn she said home care and her last document scanned in 01/2025 is home health.

## 2025-06-16 NOTE — PROGRESS NOTES
*Provider Impression*    Patient is a 89 year old female who is seen today for management of multiple medical problems       #Constipation / Flatulence - increase senna-S 8.6/50mg 2 tabs BID, add simethicone 80mg BID and q6h PRN  #Bronchospasm / Rhinitis - albuterol q6h PRN, loratadine 10mg daily, flonase 50mcg daily, budesonide 0.5/2mL BID  #CVA / HTN - eliquis 5mg BID, atenolol 50mg daily, torsemide 20mg daily, KCl 40mEq daily  #Hypothyroidism - levothyroxine 150mcg daily  #OA - acetaminophen 650mg q6h PRN  #Nausea - zofran 4mg q6h PRN, imodium 2mg daily PRN  [facility]  Follow up as needed      *Chief Complaint*   constipation      *History of Present Illness*    Patient is a 89 y/o female LTC resident of [facility] w/ PMH as below who is seen today for follow up and management of multiple medical problems.    Nursing staff reported some abdominal distension. KUB ordered w/ nonobstructive bowel gas pattern. LBM today.    She is seen lying in bed today. Unreliable historian, but appears to deny any pain. Abdomen appears non acute.     *Review of Systems*  All other systems reviewed are negative except as noted in the HPI     *Vital Signs*   Date: 6/13/25  - T: 97.4  P: 96  R: 20  BP: 117/70  SpO2: 96% on O2     *Physical Exam*  Gen: (+) NAD, (+) well-appearing  HEENT: (+) normocephalic, (+) MMM  Neck: (+) supple  Lungs: (+) CTAB, (-) wheezes, (-) rales, (-) rhonchi  Heart: (+) RRR, (+) S1 S2, (-) murmurs  Pulses: (+) palpable  Abd: (+) soft, (+) NT, (+) ND, (+) BS+, (-) guarding, Man, Rovsing, rebound  Ext: (-) edema, (-) deformity  MSK: (-) joint swelling  Skin: (+) warm, (+) dry, (-) rash  Neuro: (+) follows commands, (-) tremor, (+) alert    *Results / Data*  Coags - Date:   INR:   PT:      Lab Results   Component Value Date    WBC 20.8 (H) 11/20/2023    WBC 19.9 (H) 11/16/2023    WBC 23.1 (H) 11/15/2023    HGB 11.8 (L) 11/20/2023    HGB 13.0 11/16/2023    HGB 12.3 11/15/2023     "HCT 37.8 11/20/2023    HCT 41.6 11/16/2023    HCT 37.3 11/15/2023    MCV 87 11/20/2023     11/20/2023     11/16/2023     11/15/2023     Lab Results   Component Value Date     11/21/2023     11/20/2023     (H) 11/16/2023    K 3.7 11/21/2023    K 2.9 (LL) 11/20/2023    K 3.2 (L) 11/16/2023    CO2 31 11/21/2023    CO2 29 11/20/2023    CO2 26 11/16/2023     (H) 11/21/2023     (H) 11/20/2023     (H) 11/16/2023    BUN 12 11/21/2023    BUN 16 11/20/2023    BUN 58 (H) 11/16/2023    CREATININE 0.70 11/21/2023    CREATININE 0.80 11/20/2023    CREATININE 1.40 11/16/2023    GLUCOSE 92 11/21/2023    GLUCOSE 98 11/20/2023    GLUCOSE 105 (H) 11/16/2023    CALCIUM 8.6 11/21/2023    CALCIUM 8.5 11/20/2023    CALCIUM 8.6 11/16/2023    AST 20 07/31/2023    AST 14 03/11/2021    AST 13 09/29/2020    ALT 64 (H) 07/31/2023    ALT 5 03/11/2021    ALT 7 09/29/2020    ALKPHOS 330 (H) 07/31/2023    ALKPHOS 84 03/11/2021    ALKPHOS 74 09/29/2020     Lab Results   Component Value Date    TSH 7.85 (H) 06/28/2023    TSH 9.05 (H) 03/28/2023    TSH 6.47 (H) 12/28/2022      Lab Results   Component Value Date    HGBA1C 5.1 04/30/2019      No results found for: \"OJRHDECR80\"   No results found for: \"FOLATE\"   Lab Results   Component Value Date    CHOL 119 (L) 07/28/2023    CHOL 108 (L) 09/29/2020    CHOL 138 02/08/2020     Lab Results   Component Value Date    HDL 44 (L) 07/28/2023    HDL 42 (L) 09/29/2020    HDL 49 (L) 02/08/2020     Lab Results   Component Value Date    LDLCALC 60 (L) 07/28/2023    LDLCALC 54 (L) 09/29/2020    LDLCALC 75 02/08/2020     Lab Results   Component Value Date    TRIG 77 07/28/2023    TRIG 58 09/29/2020    TRIG 71 02/08/2020       Allergies:   Allergies[1]  PMH:  Active Ambulatory Problems     Diagnosis Date Noted    Acute bacterial conjunctivitis of both eyes 10/23/2023    Acute upper respiratory infection 10/23/2023    Chronic systolic congestive heart failure " 10/23/2023    CVA (cerebral vascular accident) (Multi) 10/23/2023    Aphasia 10/23/2023    Pneumonia of right upper lobe due to infectious organism 11/06/2023    SABA (acute kidney injury) 11/13/2023    Leukocytosis 11/13/2023    COVID-19 11/20/2023    Sepsis with acute renal failure without septic shock (Multi) 11/20/2023    Hypokalemia 11/20/2023    Chronic obstructive pulmonary disease (Multi) 06/28/2024     Resolved Ambulatory Problems     Diagnosis Date Noted    No Resolved Ambulatory Problems     No Additional Past Medical History     PSH:  Surgical History[2]  FH:  Family History[3]  SocHx:   Social History[4]         [1] Not on File  [2] No past surgical history on file.  [3] No family history on file.  [4]

## 2025-06-26 ENCOUNTER — NURSING HOME VISIT (OUTPATIENT)
Dept: POST ACUTE CARE | Facility: EXTERNAL LOCATION | Age: OVER 89
End: 2025-06-26
Payer: MEDICARE

## 2025-06-26 ENCOUNTER — LAB REQUISITION (OUTPATIENT)
Dept: LAB | Facility: HOSPITAL | Age: OVER 89
End: 2025-06-26
Payer: MEDICARE

## 2025-06-26 DIAGNOSIS — I63.9 CEREBROVASCULAR ACCIDENT (CVA), UNSPECIFIED MECHANISM (MULTI): ICD-10-CM

## 2025-06-26 DIAGNOSIS — J98.01 BRONCHOSPASM: ICD-10-CM

## 2025-06-26 DIAGNOSIS — I10 ESSENTIAL HYPERTENSION: ICD-10-CM

## 2025-06-26 DIAGNOSIS — J31.0 RHINITIS, UNSPECIFIED TYPE: ICD-10-CM

## 2025-06-26 DIAGNOSIS — M13.0 POLYARTHRITIS: ICD-10-CM

## 2025-06-26 DIAGNOSIS — E03.9 HYPOTHYROIDISM, UNSPECIFIED TYPE: ICD-10-CM

## 2025-06-26 DIAGNOSIS — R14.3 FLATULENCE: ICD-10-CM

## 2025-06-26 DIAGNOSIS — N39.9 DISORDER OF URINARY SYSTEM, UNSPECIFIED: ICD-10-CM

## 2025-06-26 DIAGNOSIS — K59.00 CONSTIPATION, UNSPECIFIED CONSTIPATION TYPE: Primary | ICD-10-CM

## 2025-06-26 DIAGNOSIS — R11.0 NAUSEA: ICD-10-CM

## 2025-06-26 LAB
APPEARANCE UR: CLEAR
BILIRUB UR STRIP.AUTO-MCNC: NEGATIVE MG/DL
COLOR UR: ABNORMAL
GLUCOSE UR STRIP.AUTO-MCNC: NORMAL MG/DL
KETONES UR STRIP.AUTO-MCNC: NEGATIVE MG/DL
LEUKOCYTE ESTERASE UR QL STRIP.AUTO: ABNORMAL
NITRITE UR QL STRIP.AUTO: ABNORMAL
PH UR STRIP.AUTO: 7 [PH]
PROT UR STRIP.AUTO-MCNC: NEGATIVE MG/DL
RBC # UR STRIP.AUTO: NEGATIVE MG/DL
RBC #/AREA URNS AUTO: ABNORMAL /HPF
SP GR UR STRIP.AUTO: 1
SQUAMOUS #/AREA URNS AUTO: ABNORMAL /HPF
UROBILINOGEN UR STRIP.AUTO-MCNC: NORMAL MG/DL
WBC #/AREA URNS AUTO: ABNORMAL /HPF

## 2025-06-26 PROCEDURE — 87086 URINE CULTURE/COLONY COUNT: CPT | Mod: OUT,TRILAB | Performed by: INTERNAL MEDICINE

## 2025-06-26 PROCEDURE — 99309 SBSQ NF CARE MODERATE MDM 30: CPT | Performed by: NURSE PRACTITIONER

## 2025-06-26 PROCEDURE — 81001 URINALYSIS AUTO W/SCOPE: CPT | Mod: OUT | Performed by: INTERNAL MEDICINE

## 2025-06-27 LAB — HOLD SPECIMEN: NORMAL

## 2025-06-28 LAB — BACTERIA UR CULT: ABNORMAL

## 2025-07-01 NOTE — PROGRESS NOTES
*Provider Impression*    Patient is a 89 year old female who is seen today for management of multiple medical problems       #Constipation / Flatulence - senna-S 8.6/50mg 2 tabs BID, increase simethicone 80mg q6h and q6h PRN, add bentyl 10mg q6h PRN  #Bronchospasm / Rhinitis - albuterol q6h PRN, loratadine 10mg daily, flonase 50mcg daily, budesonide 0.5/2mL BID  #CVA / HTN - eliquis 5mg BID, atenolol 50mg daily, torsemide 20mg daily, KCl 40mEq daily  #Hypothyroidism - levothyroxine 150mcg daily  #OA - acetaminophen 650mg q6h PRN  #Nausea - zofran 4mg q6h PRN, imodium 2mg daily PRN  [facility]  Follow up as needed      *Chief Complaint*   constipation       *History of Present Illness*    Patient is a 89 y/o female LTC resident of [facility] w/ PM as below who is seen today for follow up and management of multiple medical problems.     Nursing staff report she had abd pain, on simethicone, and senna plus, and got additional laxatives, moving every day since, no dysuria, UA was sent and pending.  Limited historian but appears in NAD.      *Review of Systems*  All other systems reviewed are negative except as noted in the HPI      *Vital Signs*   Date: 6/24/25  - T: 96.2  P: 71  R: 18  BP: 164/73  SpO2: 96% on O2      *Physical Exam*  Gen: (+) NAD, (+) well-appearing  HEENT: (+) normocephalic, (+) MMM  Neck: (+) supple  Lungs: (+) CTAB, (-) wheezes, (-) rales, (-) rhonchi  Heart: (+) RRR, (+) S1 S2, (-) murmurs  Pulses: (+) palpable  Abd: (+) soft, (+) NT, (+) ND, (+) BS+, (-) guarding, Man, Rovsing, rebound  Ext: (-) edema, (-) deformity  MSK: (-) joint swelling  Skin: (+) warm, (+) dry, (-) rash  Neuro: (+) follows commands, (-) tremor, (+) alert     *Results / Data*  Coags - Date:   INR:   PT:      Lab Results   Component Value Date    WBC 20.8 (H) 11/20/2023    WBC 19.9 (H) 11/16/2023    WBC 23.1 (H) 11/15/2023    HGB 11.8 (L) 11/20/2023    HGB 13.0 11/16/2023    HGB 12.3  "11/15/2023    HCT 37.8 11/20/2023    HCT 41.6 11/16/2023    HCT 37.3 11/15/2023    MCV 87 11/20/2023     11/20/2023     11/16/2023     11/15/2023     Lab Results   Component Value Date     11/21/2023     11/20/2023     (H) 11/16/2023    K 3.7 11/21/2023    K 2.9 (LL) 11/20/2023    K 3.2 (L) 11/16/2023    CO2 31 11/21/2023    CO2 29 11/20/2023    CO2 26 11/16/2023     (H) 11/21/2023     (H) 11/20/2023     (H) 11/16/2023    BUN 12 11/21/2023    BUN 16 11/20/2023    BUN 58 (H) 11/16/2023    CREATININE 0.70 11/21/2023    CREATININE 0.80 11/20/2023    CREATININE 1.40 11/16/2023    GLUCOSE 92 11/21/2023    GLUCOSE 98 11/20/2023    GLUCOSE 105 (H) 11/16/2023    CALCIUM 8.6 11/21/2023    CALCIUM 8.5 11/20/2023    CALCIUM 8.6 11/16/2023    AST 20 07/31/2023    AST 14 03/11/2021    AST 13 09/29/2020    ALT 64 (H) 07/31/2023    ALT 5 03/11/2021    ALT 7 09/29/2020    ALKPHOS 330 (H) 07/31/2023    ALKPHOS 84 03/11/2021    ALKPHOS 74 09/29/2020     Lab Results   Component Value Date    TSH 7.85 (H) 06/28/2023    TSH 9.05 (H) 03/28/2023    TSH 6.47 (H) 12/28/2022      Lab Results   Component Value Date    HGBA1C 5.1 04/30/2019      No results found for: \"[id number]\"   No results found for: \"FOLATE\"   Lab Results   Component Value Date    CHOL 119 (L) 07/28/2023    CHOL 108 (L) 09/29/2020    CHOL 138 02/08/2020     Lab Results   Component Value Date    HDL 44 (L) 07/28/2023    HDL 42 (L) 09/29/2020    HDL 49 (L) 02/08/2020     Lab Results   Component Value Date    LDLCALC 60 (L) 07/28/2023    LDLCALC 54 (L) 09/29/2020    LDLCALC 75 02/08/2020     Lab Results   Component Value Date    TRIG 77 07/28/2023    TRIG 58 09/29/2020    TRIG 71 02/08/2020       Allergies:   Allergies[1]  PMH:  Active Ambulatory Problems     Diagnosis Date Noted    Acute bacterial conjunctivitis of both eyes 10/23/2023    Acute upper respiratory infection 10/23/2023    Chronic systolic congestive heart " failure 10/23/2023    CVA (cerebral vascular accident) (Multi) 10/23/2023    Aphasia 10/23/2023    Pneumonia of right upper lobe due to infectious organism 11/06/2023    SABA (acute kidney injury) 11/13/2023    Leukocytosis 11/13/2023    COVID-19 11/20/2023    Sepsis with acute renal failure without septic shock (Multi) 11/20/2023    Hypokalemia 11/20/2023    Chronic obstructive pulmonary disease (Multi) 06/28/2024     Resolved Ambulatory Problems     Diagnosis Date Noted    No Resolved Ambulatory Problems     No Additional Past Medical History     PSH:  Surgical History[2]  FH:  Family History[3]  SocHx:   Social History[4]         [1] Not on File  [2] No past surgical history on file.  [3] No family history on file.  [4]

## 2025-07-10 ENCOUNTER — NURSING HOME VISIT (OUTPATIENT)
Dept: POST ACUTE CARE | Facility: EXTERNAL LOCATION | Age: OVER 89
End: 2025-07-10
Payer: MEDICARE

## 2025-07-10 DIAGNOSIS — R11.0 NAUSEA: ICD-10-CM

## 2025-07-10 DIAGNOSIS — I10 ESSENTIAL HYPERTENSION: ICD-10-CM

## 2025-07-10 DIAGNOSIS — K59.00 CONSTIPATION, UNSPECIFIED CONSTIPATION TYPE: ICD-10-CM

## 2025-07-10 DIAGNOSIS — R14.3 FLATULENCE: ICD-10-CM

## 2025-07-10 DIAGNOSIS — M13.0 POLYARTHRITIS: ICD-10-CM

## 2025-07-10 DIAGNOSIS — I63.9 CEREBROVASCULAR ACCIDENT (CVA), UNSPECIFIED MECHANISM (MULTI): ICD-10-CM

## 2025-07-10 DIAGNOSIS — J31.0 RHINITIS, UNSPECIFIED TYPE: ICD-10-CM

## 2025-07-10 DIAGNOSIS — E03.9 HYPOTHYROIDISM, UNSPECIFIED TYPE: ICD-10-CM

## 2025-07-10 DIAGNOSIS — J18.9 HCAP (HEALTHCARE-ASSOCIATED PNEUMONIA): Primary | ICD-10-CM

## 2025-07-10 PROCEDURE — 99309 SBSQ NF CARE MODERATE MDM 30: CPT | Performed by: NURSE PRACTITIONER

## 2025-07-10 NOTE — LETTER
Patient: Leola Ward  : 1928    Encounter Date: 07/10/2025    *Provider Impression*    Patient is a 89 year old female who is seen today for management of multiple medical problems       #HCAP / Rhinitis - albuterol q6h PRN, loratadine 10mg daily, flonase 50mcg daily, budesonide 0.5/2mL BID, add levaquin 500mg daily until   #Constipation / Flatulence - senna-S 8.6/50mg 2 tabs BID, simethicone 80mg q6h and q6h PRN, bentyl 10mg q6h PRN  #CVA / HTN - eliquis 5mg BID, atenolol 50mg daily, torsemide 20mg daily, KCl 40mEq daily  #Hypothyroidism - levothyroxine 150mcg daily  #OA - acetaminophen 650mg q6h PRN  #Nausea - zofran 4mg q6h PRN, imodium 2mg daily PRN  #[facility] - [facility] - [facility]  Follow up as needed      *Chief Complaint*   HCAP       *History of Present Illness*    Patient is a 89 y/o female LTC resident of [facility] w/ PMH as below who is seen today for follow up and management of multiple medical problems.     Nursing staff report that she has cough, CXR ordered and shows bilateral PNA.    She is seen sitting up in her room today and Limited historian but appears in NAD.      *Review of Systems*  All other systems reviewed are negative except as noted in the HPI      *Vital Signs*   Date: 7/10/25  - T: 96.6  P: 89  R: 22  BP: 105/66  SpO2: 84% on O2      *Physical Exam*  Gen: (+) NAD, (+) well-appearing  HEENT: (+) normocephalic, (+) MMM  Neck: (+) supple  Lungs: (-) CTAB, (-) wheezes, (-) rales, (+) rhonchi  Heart: (+) RRR, (+) S1 S2, (-) murmurs  Pulses: (+) palpable  Abd: (+) soft, (+) NT, (+) ND, (+) BS+  Ext: (-) edema, (-) deformity  MSK: (-) joint swelling  Skin: (+) warm, (+) dry, (-) rash  Neuro: (+) follows commands, (-) tremor, (+) alert     *Results / Data*  Coags - Date:   INR:   PT:      Lab Results   Component Value Date    WBC 20.8 (H) 2023    WBC 19.9 (H) 2023    WBC 23.1 (H) 11/15/2023    HGB 11.8 (L) 2023    HGB 13.0 2023    HGB 12.3  "11/15/2023    HCT 37.8 11/20/2023    HCT 41.6 11/16/2023    HCT 37.3 11/15/2023    MCV 87 11/20/2023     11/20/2023     11/16/2023     11/15/2023     Lab Results   Component Value Date     11/21/2023     11/20/2023     (H) 11/16/2023    K 3.7 11/21/2023    K 2.9 (LL) 11/20/2023    K 3.2 (L) 11/16/2023    CO2 31 11/21/2023    CO2 29 11/20/2023    CO2 26 11/16/2023     (H) 11/21/2023     (H) 11/20/2023     (H) 11/16/2023    BUN 12 11/21/2023    BUN 16 11/20/2023    BUN 58 (H) 11/16/2023    CREATININE 0.70 11/21/2023    CREATININE 0.80 11/20/2023    CREATININE 1.40 11/16/2023    GLUCOSE 92 11/21/2023    GLUCOSE 98 11/20/2023    GLUCOSE 105 (H) 11/16/2023    CALCIUM 8.6 11/21/2023    CALCIUM 8.5 11/20/2023    CALCIUM 8.6 11/16/2023    AST 20 07/31/2023    AST 14 03/11/2021    AST 13 09/29/2020    ALT 64 (H) 07/31/2023    ALT 5 03/11/2021    ALT 7 09/29/2020    ALKPHOS 330 (H) 07/31/2023    ALKPHOS 84 03/11/2021    ALKPHOS 74 09/29/2020     Lab Results   Component Value Date    TSH 7.85 (H) 06/28/2023    TSH 9.05 (H) 03/28/2023    TSH 6.47 (H) 12/28/2022      Lab Results   Component Value Date    HGBA1C 5.1 04/30/2019      No results found for: \"[id number]\"   No results found for: \"FOLATE\"   Lab Results   Component Value Date    CHOL 119 (L) 07/28/2023    CHOL 108 (L) 09/29/2020    CHOL 138 02/08/2020     Lab Results   Component Value Date    HDL 44 (L) 07/28/2023    HDL 42 (L) 09/29/2020    HDL 49 (L) 02/08/2020     Lab Results   Component Value Date    LDLCALC 60 (L) 07/28/2023    LDLCALC 54 (L) 09/29/2020    LDLCALC 75 02/08/2020     Lab Results   Component Value Date    TRIG 77 07/28/2023    TRIG 58 09/29/2020    TRIG 71 02/08/2020       Allergies:   Allergies[1]  PMH:  Active Ambulatory Problems     Diagnosis Date Noted   • Acute bacterial conjunctivitis of both eyes 10/23/2023   • Acute upper respiratory infection 10/23/2023   • Chronic systolic congestive " heart failure 10/23/2023   • CVA (cerebral vascular accident) (Multi) 10/23/2023   • Aphasia 10/23/2023   • Pneumonia of right upper lobe due to infectious organism 11/06/2023   • SABA (acute kidney injury) 11/13/2023   • Leukocytosis 11/13/2023   • COVID-19 11/20/2023   • Sepsis with acute renal failure without septic shock (Multi) 11/20/2023   • Hypokalemia 11/20/2023   • Chronic obstructive pulmonary disease (Multi) 06/28/2024     Resolved Ambulatory Problems     Diagnosis Date Noted   • No Resolved Ambulatory Problems     No Additional Past Medical History     PSH:  Surgical History[2]  FH:  Family History[3]  SocHx:   Social History[4]        Electronically Signed By: DARCY Plata   7/15/25  8:08 PM       [1]  Not on File  [2]  No past surgical history on file.  [3]  No family history on file.  [4]

## 2025-07-16 NOTE — PROGRESS NOTES
*Provider Impression*    Patient is a 89 year old female who is seen today for management of multiple medical problems       #HCAP / Rhinitis - albuterol q6h PRN, loratadine 10mg daily, flonase 50mcg daily, budesonide 0.5/2mL BID, add levaquin 500mg daily until 7/18  #Constipation / Flatulence - senna-S 8.6/50mg 2 tabs BID, simethicone 80mg q6h and q6h PRN, bentyl 10mg q6h PRN  #CVA / HTN - eliquis 5mg BID, atenolol 50mg daily, torsemide 20mg daily, KCl 40mEq daily  #Hypothyroidism - levothyroxine 150mcg daily  #OA - acetaminophen 650mg q6h PRN  #Nausea - zofran 4mg q6h PRN, imodium 2mg daily PRN  #[facility] - [facility] - [facility]  Follow up as needed      *Chief Complaint*   HCAP       *History of Present Illness*    Patient is a 89 y/o female LTC resident of [facility] w/ PMH as below who is seen today for follow up and management of multiple medical problems.     Nursing staff report that she has cough, CXR ordered and shows bilateral PNA.    She is seen sitting up in her room today and Limited historian but appears in NAD.      *Review of Systems*  All other systems reviewed are negative except as noted in the HPI      *Vital Signs*   Date: 7/10/25  - T: 96.6  P: 89  R: 22  BP: 105/66  SpO2: 84% on O2      *Physical Exam*  Gen: (+) NAD, (+) well-appearing  HEENT: (+) normocephalic, (+) MMM  Neck: (+) supple  Lungs: (-) CTAB, (-) wheezes, (-) rales, (+) rhonchi  Heart: (+) RRR, (+) S1 S2, (-) murmurs  Pulses: (+) palpable  Abd: (+) soft, (+) NT, (+) ND, (+) BS+  Ext: (-) edema, (-) deformity  MSK: (-) joint swelling  Skin: (+) warm, (+) dry, (-) rash  Neuro: (+) follows commands, (-) tremor, (+) alert     *Results / Data*  Coags - Date:   INR:   PT:      Lab Results   Component Value Date    WBC 20.8 (H) 11/20/2023    WBC 19.9 (H) 11/16/2023    WBC 23.1 (H) 11/15/2023    HGB 11.8 (L) 11/20/2023    HGB 13.0 11/16/2023    HGB 12.3 11/15/2023    HCT 37.8 11/20/2023    HCT 41.6 11/16/2023    HCT 37.3  "11/15/2023    MCV 87 11/20/2023     11/20/2023     11/16/2023     11/15/2023     Lab Results   Component Value Date     11/21/2023     11/20/2023     (H) 11/16/2023    K 3.7 11/21/2023    K 2.9 (LL) 11/20/2023    K 3.2 (L) 11/16/2023    CO2 31 11/21/2023    CO2 29 11/20/2023    CO2 26 11/16/2023     (H) 11/21/2023     (H) 11/20/2023     (H) 11/16/2023    BUN 12 11/21/2023    BUN 16 11/20/2023    BUN 58 (H) 11/16/2023    CREATININE 0.70 11/21/2023    CREATININE 0.80 11/20/2023    CREATININE 1.40 11/16/2023    GLUCOSE 92 11/21/2023    GLUCOSE 98 11/20/2023    GLUCOSE 105 (H) 11/16/2023    CALCIUM 8.6 11/21/2023    CALCIUM 8.5 11/20/2023    CALCIUM 8.6 11/16/2023    AST 20 07/31/2023    AST 14 03/11/2021    AST 13 09/29/2020    ALT 64 (H) 07/31/2023    ALT 5 03/11/2021    ALT 7 09/29/2020    ALKPHOS 330 (H) 07/31/2023    ALKPHOS 84 03/11/2021    ALKPHOS 74 09/29/2020     Lab Results   Component Value Date    TSH 7.85 (H) 06/28/2023    TSH 9.05 (H) 03/28/2023    TSH 6.47 (H) 12/28/2022      Lab Results   Component Value Date    HGBA1C 5.1 04/30/2019      No results found for: \"[id number]\"   No results found for: \"FOLATE\"   Lab Results   Component Value Date    CHOL 119 (L) 07/28/2023    CHOL 108 (L) 09/29/2020    CHOL 138 02/08/2020     Lab Results   Component Value Date    HDL 44 (L) 07/28/2023    HDL 42 (L) 09/29/2020    HDL 49 (L) 02/08/2020     Lab Results   Component Value Date    LDLCALC 60 (L) 07/28/2023    LDLCALC 54 (L) 09/29/2020    LDLCALC 75 02/08/2020     Lab Results   Component Value Date    TRIG 77 07/28/2023    TRIG 58 09/29/2020    TRIG 71 02/08/2020       Allergies:   Allergies[1]  PMH:  Active Ambulatory Problems     Diagnosis Date Noted    Acute bacterial conjunctivitis of both eyes 10/23/2023    Acute upper respiratory infection 10/23/2023    Chronic systolic congestive heart failure 10/23/2023    CVA (cerebral vascular accident) (Multi) " 10/23/2023    Aphasia 10/23/2023    Pneumonia of right upper lobe due to infectious organism 11/06/2023    SABA (acute kidney injury) 11/13/2023    Leukocytosis 11/13/2023    COVID-19 11/20/2023    Sepsis with acute renal failure without septic shock (Multi) 11/20/2023    Hypokalemia 11/20/2023    Chronic obstructive pulmonary disease (Multi) 06/28/2024     Resolved Ambulatory Problems     Diagnosis Date Noted    No Resolved Ambulatory Problems     No Additional Past Medical History     PSH:  Surgical History[2]  FH:  Family History[3]  SocHx:   Social History[4]         [1] Not on File  [2] No past surgical history on file.  [3] No family history on file.  [4]

## 2025-08-14 ENCOUNTER — NURSING HOME VISIT (OUTPATIENT)
Dept: POST ACUTE CARE | Facility: EXTERNAL LOCATION | Age: OVER 89
End: 2025-08-14
Payer: MEDICARE

## 2025-08-14 DIAGNOSIS — I10 ESSENTIAL HYPERTENSION: ICD-10-CM

## 2025-08-14 DIAGNOSIS — I48.91 ATRIAL FIBRILLATION, UNSPECIFIED TYPE (MULTI): ICD-10-CM

## 2025-08-14 DIAGNOSIS — I69.351 HEMIPARESIS OF RIGHT DOMINANT SIDE AS LATE EFFECT OF CEREBRAL INFARCTION (MULTI): ICD-10-CM

## 2025-08-14 DIAGNOSIS — I63.9 CEREBROVASCULAR ACCIDENT (CVA), UNSPECIFIED MECHANISM (MULTI): Primary | ICD-10-CM

## 2025-08-14 DIAGNOSIS — E03.9 HYPOTHYROIDISM, UNSPECIFIED TYPE: ICD-10-CM
